# Patient Record
Sex: FEMALE | Race: WHITE | NOT HISPANIC OR LATINO | Employment: OTHER | URBAN - METROPOLITAN AREA
[De-identification: names, ages, dates, MRNs, and addresses within clinical notes are randomized per-mention and may not be internally consistent; named-entity substitution may affect disease eponyms.]

---

## 2017-05-25 ENCOUNTER — HOSPITAL ENCOUNTER (OUTPATIENT)
Dept: MRI IMAGING | Facility: HOSPITAL | Age: 67
Discharge: HOME | End: 2017-05-25
Attending: FAMILY MEDICINE | Admitting: FAMILY MEDICINE

## 2017-05-26 ENCOUNTER — HOSPITAL ENCOUNTER (OUTPATIENT)
Dept: MRI IMAGING | Facility: HOSPITAL | Age: 67
Discharge: HOME | End: 2017-05-26
Attending: FAMILY MEDICINE | Admitting: FAMILY MEDICINE

## 2019-04-12 ENCOUNTER — HOSPITAL ENCOUNTER (OUTPATIENT)
Dept: OTHER | Facility: HOSPITAL | Age: 69
Setting detail: SPECIMEN
Discharge: HOME | End: 2019-04-12
Attending: INTERNAL MEDICINE | Admitting: INTERNAL MEDICINE

## 2019-04-12 ENCOUNTER — ON CAMPUS - OUTPATIENT (AMBULATORY)
Dept: URBAN - METROPOLITAN AREA HOSPITAL 2 | Facility: HOSPITAL | Age: 69
End: 2019-04-12

## 2019-04-12 ENCOUNTER — OFFICE (AMBULATORY)
Dept: URBAN - METROPOLITAN AREA PATHOLOGY 4 | Facility: PATHOLOGY | Age: 69
End: 2019-04-12

## 2019-04-12 VITALS
OXYGEN SATURATION: 96 % | DIASTOLIC BLOOD PRESSURE: 82 MMHG | HEIGHT: 62 IN | HEART RATE: 68 BPM | HEART RATE: 66 BPM | RESPIRATION RATE: 18 BRPM | DIASTOLIC BLOOD PRESSURE: 67 MMHG | HEART RATE: 74 BPM | SYSTOLIC BLOOD PRESSURE: 108 MMHG | SYSTOLIC BLOOD PRESSURE: 109 MMHG | WEIGHT: 152 LBS | DIASTOLIC BLOOD PRESSURE: 71 MMHG | SYSTOLIC BLOOD PRESSURE: 112 MMHG | OXYGEN SATURATION: 94 % | SYSTOLIC BLOOD PRESSURE: 105 MMHG | HEART RATE: 70 BPM | DIASTOLIC BLOOD PRESSURE: 68 MMHG | DIASTOLIC BLOOD PRESSURE: 54 MMHG | DIASTOLIC BLOOD PRESSURE: 91 MMHG | TEMPERATURE: 97 F | HEART RATE: 62 BPM | SYSTOLIC BLOOD PRESSURE: 142 MMHG | HEART RATE: 72 BPM | DIASTOLIC BLOOD PRESSURE: 58 MMHG | SYSTOLIC BLOOD PRESSURE: 90 MMHG | RESPIRATION RATE: 16 BRPM | OXYGEN SATURATION: 97 % | OXYGEN SATURATION: 99 % | HEART RATE: 69 BPM | SYSTOLIC BLOOD PRESSURE: 84 MMHG | OXYGEN SATURATION: 100 % | DIASTOLIC BLOOD PRESSURE: 83 MMHG

## 2019-04-12 DIAGNOSIS — Z86.010 PERSONAL HISTORY OF COLONIC POLYPS: ICD-10-CM

## 2019-04-12 DIAGNOSIS — K64.1 SECOND DEGREE HEMORRHOIDS: ICD-10-CM

## 2019-04-12 DIAGNOSIS — D12.3 BENIGN NEOPLASM OF TRANSVERSE COLON: ICD-10-CM

## 2019-04-12 LAB
GI HISTOLOGY: A. UNSPECIFIED: (no result)
GI HISTOLOGY: PDF REPORT: (no result)

## 2019-04-12 PROCEDURE — 45385 COLONOSCOPY W/LESION REMOVAL: CPT | Mod: PT | Performed by: INTERNAL MEDICINE

## 2019-04-12 PROCEDURE — 88305 TISSUE EXAM BY PATHOLOGIST: CPT | Mod: 26 | Performed by: INTERNAL MEDICINE

## 2020-02-18 ENCOUNTER — HOSPITAL ENCOUNTER (INPATIENT)
Facility: HOSPITAL | Age: 70
LOS: 3 days | Discharge: HOME HEALTH | End: 2020-02-21
Attending: INTERNAL MEDICINE | Admitting: HOSPITALIST

## 2020-02-18 PROBLEM — I21.4 NON-STEMI (NON-ST ELEVATED MYOCARDIAL INFARCTION) (HCC): Status: ACTIVE | Noted: 2020-02-18

## 2020-02-18 PROCEDURE — 99222 1ST HOSP IP/OBS MODERATE 55: CPT | Performed by: NURSE PRACTITIONER

## 2020-02-18 RX ORDER — DULOXETIN HYDROCHLORIDE 30 MG/1
60 CAPSULE, DELAYED RELEASE ORAL 2 TIMES DAILY
Status: DISCONTINUED | OUTPATIENT
Start: 2020-02-19 | End: 2020-02-21 | Stop reason: HOSPADM

## 2020-02-18 RX ORDER — SODIUM CHLORIDE 0.9 % (FLUSH) 0.9 %
10 SYRINGE (ML) INJECTION EVERY 12 HOURS SCHEDULED
Status: DISCONTINUED | OUTPATIENT
Start: 2020-02-18 | End: 2020-02-21 | Stop reason: HOSPADM

## 2020-02-18 RX ORDER — ONDANSETRON 4 MG/1
4 TABLET, FILM COATED ORAL EVERY 6 HOURS PRN
Status: DISCONTINUED | OUTPATIENT
Start: 2020-02-18 | End: 2020-02-21 | Stop reason: HOSPADM

## 2020-02-18 RX ORDER — LISINOPRIL 20 MG/1
20 TABLET ORAL DAILY
Status: DISCONTINUED | OUTPATIENT
Start: 2020-02-19 | End: 2020-02-21 | Stop reason: HOSPADM

## 2020-02-18 RX ORDER — ONDANSETRON 2 MG/ML
4 INJECTION INTRAMUSCULAR; INTRAVENOUS EVERY 6 HOURS PRN
Status: DISCONTINUED | OUTPATIENT
Start: 2020-02-18 | End: 2020-02-21 | Stop reason: HOSPADM

## 2020-02-18 RX ORDER — CLONAZEPAM 1 MG/1
1 TABLET ORAL 2 TIMES DAILY PRN
COMMUNITY
End: 2020-09-04

## 2020-02-18 RX ORDER — OXYCODONE HYDROCHLORIDE 5 MG/1
10 TABLET ORAL EVERY 6 HOURS PRN
Status: DISCONTINUED | OUTPATIENT
Start: 2020-02-18 | End: 2020-02-21 | Stop reason: HOSPADM

## 2020-02-18 RX ORDER — DIPHENOXYLATE HYDROCHLORIDE AND ATROPINE SULFATE 2.5; .025 MG/1; MG/1
1 TABLET ORAL 2 TIMES DAILY
Status: DISCONTINUED | OUTPATIENT
Start: 2020-02-19 | End: 2020-02-21 | Stop reason: HOSPADM

## 2020-02-18 RX ORDER — DIPHENOXYLATE HYDROCHLORIDE AND ATROPINE SULFATE 2.5; .025 MG/1; MG/1
1 TABLET ORAL 2 TIMES DAILY
COMMUNITY

## 2020-02-18 RX ORDER — SODIUM CHLORIDE 0.9 % (FLUSH) 0.9 %
10 SYRINGE (ML) INJECTION AS NEEDED
Status: DISCONTINUED | OUTPATIENT
Start: 2020-02-18 | End: 2020-02-21 | Stop reason: HOSPADM

## 2020-02-18 RX ORDER — LISINOPRIL 20 MG/1
20 TABLET ORAL AS NEEDED
COMMUNITY

## 2020-02-18 RX ORDER — OXYCODONE HYDROCHLORIDE 10 MG/1
10 TABLET ORAL EVERY 6 HOURS PRN
COMMUNITY

## 2020-02-18 RX ORDER — SODIUM CHLORIDE 9 MG/ML
100 INJECTION, SOLUTION INTRAVENOUS CONTINUOUS
Status: DISCONTINUED | OUTPATIENT
Start: 2020-02-18 | End: 2020-02-21 | Stop reason: HOSPADM

## 2020-02-18 RX ORDER — GABAPENTIN 300 MG/1
300 CAPSULE ORAL 3 TIMES DAILY
Status: DISCONTINUED | OUTPATIENT
Start: 2020-02-19 | End: 2020-02-21 | Stop reason: HOSPADM

## 2020-02-18 RX ORDER — DULOXETIN HYDROCHLORIDE 60 MG/1
60 CAPSULE, DELAYED RELEASE ORAL 2 TIMES DAILY
COMMUNITY

## 2020-02-18 RX ORDER — CLONAZEPAM 1 MG/1
1 TABLET ORAL 2 TIMES DAILY PRN
Status: DISCONTINUED | OUTPATIENT
Start: 2020-02-18 | End: 2020-02-21 | Stop reason: HOSPADM

## 2020-02-18 RX ORDER — GABAPENTIN 300 MG/1
300 CAPSULE ORAL 3 TIMES DAILY
COMMUNITY
End: 2020-09-04

## 2020-02-18 RX ADMIN — Medication 10 ML: at 21:48

## 2020-02-18 RX ADMIN — Medication 10 ML: at 21:47

## 2020-02-18 RX ADMIN — SODIUM CHLORIDE 100 ML/HR: 900 INJECTION, SOLUTION INTRAVENOUS at 22:07

## 2020-02-18 NOTE — NURSING NOTE
Pt arrived via ambulance accompanied by from [facility].  Pt placed in bed and attached to monitors. Denies pain at present and VSS  on monitor. Reported pt had some c/p on ride over but informed EMT was getting better and no tx given.No changes on monitor noted. Report received from Padmini at [facility] in [place]

## 2020-02-19 ENCOUNTER — APPOINTMENT (OUTPATIENT)
Dept: NUCLEAR MEDICINE | Facility: HOSPITAL | Age: 70
End: 2020-02-19

## 2020-02-19 PROBLEM — K21.9 GASTROESOPHAGEAL REFLUX DISEASE: Status: ACTIVE | Noted: 2020-02-19

## 2020-02-19 PROBLEM — I25.10 CORONARY ARTERY DISEASE: Status: ACTIVE | Noted: 2020-02-19

## 2020-02-19 PROBLEM — E78.5 HYPERLIPIDEMIA: Status: ACTIVE | Noted: 2020-02-19

## 2020-02-19 PROBLEM — F32.A DEPRESSION: Status: ACTIVE | Noted: 2020-02-19

## 2020-02-19 LAB
ANION GAP SERPL CALCULATED.3IONS-SCNC: 9 MMOL/L (ref 5–15)
APTT PPP: 25.6 SECONDS (ref 24–31)
BASOPHILS # BLD AUTO: 0.1 10*3/MM3 (ref 0–0.2)
BASOPHILS NFR BLD AUTO: 1.8 % (ref 0–1.5)
BUN BLD-MCNC: 13 MG/DL (ref 8–23)
BUN/CREAT SERPL: 13 (ref 7–25)
CALCIUM SPEC-SCNC: 8.1 MG/DL (ref 8.6–10.5)
CHLORIDE SERPL-SCNC: 106 MMOL/L (ref 98–107)
CHOLEST SERPL-MCNC: 146 MG/DL (ref 0–200)
CK SERPL-CCNC: 40 U/L (ref 20–180)
CO2 SERPL-SCNC: 25 MMOL/L (ref 22–29)
CREAT BLD-MCNC: 1 MG/DL (ref 0.57–1)
DEPRECATED RDW RBC AUTO: 40.3 FL (ref 37–54)
EOSINOPHIL # BLD AUTO: 0.3 10*3/MM3 (ref 0–0.4)
EOSINOPHIL NFR BLD AUTO: 9.6 % (ref 0.3–6.2)
ERYTHROCYTE [DISTWIDTH] IN BLOOD BY AUTOMATED COUNT: 12.4 % (ref 12.3–15.4)
GFR SERPL CREATININE-BSD FRML MDRD: 55 ML/MIN/1.73
GLUCOSE BLD-MCNC: 124 MG/DL (ref 65–99)
HCT VFR BLD AUTO: 33.3 % (ref 34–46.6)
HDLC SERPL-MCNC: 38 MG/DL (ref 40–60)
HGB BLD-MCNC: 11.8 G/DL (ref 12–15.9)
INR PPP: 0.99 (ref 0.9–1.1)
LDLC SERPL CALC-MCNC: 79 MG/DL (ref 0–100)
LDLC/HDLC SERPL: 2.07 {RATIO}
LYMPHOCYTES # BLD AUTO: 1.2 10*3/MM3 (ref 0.7–3.1)
LYMPHOCYTES NFR BLD AUTO: 37.3 % (ref 19.6–45.3)
MCH RBC QN AUTO: 32.4 PG (ref 26.6–33)
MCHC RBC AUTO-ENTMCNC: 35.4 G/DL (ref 31.5–35.7)
MCV RBC AUTO: 91.7 FL (ref 79–97)
MONOCYTES # BLD AUTO: 0.4 10*3/MM3 (ref 0.1–0.9)
MONOCYTES NFR BLD AUTO: 13.3 % (ref 5–12)
NEUTROPHILS # BLD AUTO: 1.2 10*3/MM3 (ref 1.7–7)
NEUTROPHILS NFR BLD AUTO: 38 % (ref 42.7–76)
NRBC BLD AUTO-RTO: 0.1 /100 WBC (ref 0–0.2)
PLATELET # BLD AUTO: 214 10*3/MM3 (ref 140–450)
PMV BLD AUTO: 8.2 FL (ref 6–12)
POTASSIUM BLD-SCNC: 3.9 MMOL/L (ref 3.5–5.2)
PROTHROMBIN TIME: 10.4 SECONDS (ref 9.6–11.7)
RBC # BLD AUTO: 3.63 10*6/MM3 (ref 3.77–5.28)
SODIUM BLD-SCNC: 140 MMOL/L (ref 136–145)
TRIGL SERPL-MCNC: 146 MG/DL (ref 0–150)
TROPONIN T SERPL-MCNC: <0.01 NG/ML (ref 0–0.03)
TROPONIN T SERPL-MCNC: <0.01 NG/ML (ref 0–0.03)
TSH SERPL DL<=0.05 MIU/L-ACNC: 0.66 UIU/ML (ref 0.27–4.2)
VLDLC SERPL-MCNC: 29.2 MG/DL
WBC NRBC COR # BLD: 3.1 10*3/MM3 (ref 3.4–10.8)

## 2020-02-19 PROCEDURE — 97116 GAIT TRAINING THERAPY: CPT

## 2020-02-19 PROCEDURE — 84484 ASSAY OF TROPONIN QUANT: CPT | Performed by: NURSE PRACTITIONER

## 2020-02-19 PROCEDURE — 25010000002 PROMETHAZINE PER 50 MG: Performed by: HOSPITALIST

## 2020-02-19 PROCEDURE — 84443 ASSAY THYROID STIM HORMONE: CPT | Performed by: NURSE PRACTITIONER

## 2020-02-19 PROCEDURE — 93018 CV STRESS TEST I&R ONLY: CPT | Performed by: INTERNAL MEDICINE

## 2020-02-19 PROCEDURE — 82550 ASSAY OF CK (CPK): CPT | Performed by: NURSE PRACTITIONER

## 2020-02-19 PROCEDURE — 99232 SBSQ HOSP IP/OBS MODERATE 35: CPT | Performed by: HOSPITALIST

## 2020-02-19 PROCEDURE — 85025 COMPLETE CBC W/AUTO DIFF WBC: CPT | Performed by: NURSE PRACTITIONER

## 2020-02-19 PROCEDURE — 85730 THROMBOPLASTIN TIME PARTIAL: CPT | Performed by: NURSE PRACTITIONER

## 2020-02-19 PROCEDURE — 25010000002 REGADENOSON 0.4 MG/5ML SOLUTION: Performed by: HOSPITALIST

## 2020-02-19 PROCEDURE — 99222 1ST HOSP IP/OBS MODERATE 55: CPT | Performed by: INTERNAL MEDICINE

## 2020-02-19 PROCEDURE — 0 TECHNETIUM SESTAMIBI: Performed by: HOSPITALIST

## 2020-02-19 PROCEDURE — 85610 PROTHROMBIN TIME: CPT | Performed by: NURSE PRACTITIONER

## 2020-02-19 PROCEDURE — 78452 HT MUSCLE IMAGE SPECT MULT: CPT

## 2020-02-19 PROCEDURE — A9500 TC99M SESTAMIBI: HCPCS | Performed by: HOSPITALIST

## 2020-02-19 PROCEDURE — 97162 PT EVAL MOD COMPLEX 30 MIN: CPT

## 2020-02-19 PROCEDURE — 97112 NEUROMUSCULAR REEDUCATION: CPT

## 2020-02-19 PROCEDURE — 97110 THERAPEUTIC EXERCISES: CPT

## 2020-02-19 PROCEDURE — 80061 LIPID PANEL: CPT | Performed by: NURSE PRACTITIONER

## 2020-02-19 PROCEDURE — 78452 HT MUSCLE IMAGE SPECT MULT: CPT | Performed by: INTERNAL MEDICINE

## 2020-02-19 PROCEDURE — 80048 BASIC METABOLIC PNL TOTAL CA: CPT | Performed by: NURSE PRACTITIONER

## 2020-02-19 PROCEDURE — 93016 CV STRESS TEST SUPVJ ONLY: CPT | Performed by: INTERNAL MEDICINE

## 2020-02-19 PROCEDURE — 93017 CV STRESS TEST TRACING ONLY: CPT

## 2020-02-19 PROCEDURE — 25010000002 ENOXAPARIN PER 10 MG: Performed by: HOSPITALIST

## 2020-02-19 RX ORDER — PANTOPRAZOLE SODIUM 40 MG/10ML
40 INJECTION, POWDER, LYOPHILIZED, FOR SOLUTION INTRAVENOUS
Status: DISCONTINUED | OUTPATIENT
Start: 2020-02-19 | End: 2020-02-19

## 2020-02-19 RX ORDER — PANTOPRAZOLE SODIUM 40 MG/1
40 TABLET, DELAYED RELEASE ORAL 2 TIMES DAILY
Status: DISCONTINUED | OUTPATIENT
Start: 2020-02-19 | End: 2020-02-21 | Stop reason: HOSPADM

## 2020-02-19 RX ADMIN — Medication 10 ML: at 21:29

## 2020-02-19 RX ADMIN — GABAPENTIN 300 MG: 300 CAPSULE ORAL at 22:01

## 2020-02-19 RX ADMIN — DULOXETINE 60 MG: 30 CAPSULE, DELAYED RELEASE ORAL at 00:12

## 2020-02-19 RX ADMIN — REGADENOSON 0.4 MG: 0.08 INJECTION, SOLUTION INTRAVENOUS at 13:40

## 2020-02-19 RX ADMIN — DIPHENOXYLATE HYDROCHLORIDE AND ATROPINE SULFATE 1 TABLET: 2.5; .025 TABLET ORAL at 00:12

## 2020-02-19 RX ADMIN — CLONAZEPAM 1 MG: 1 TABLET ORAL at 02:49

## 2020-02-19 RX ADMIN — SODIUM CHLORIDE 12.5 MG: 900 INJECTION, SOLUTION INTRAVENOUS at 23:39

## 2020-02-19 RX ADMIN — GABAPENTIN 300 MG: 300 CAPSULE ORAL at 16:39

## 2020-02-19 RX ADMIN — OXYCODONE HYDROCHLORIDE 10 MG: 5 TABLET ORAL at 21:36

## 2020-02-19 RX ADMIN — OXYCODONE HYDROCHLORIDE 10 MG: 5 TABLET ORAL at 11:10

## 2020-02-19 RX ADMIN — GABAPENTIN 300 MG: 300 CAPSULE ORAL at 00:12

## 2020-02-19 RX ADMIN — OXYCODONE HYDROCHLORIDE 10 MG: 5 TABLET ORAL at 00:12

## 2020-02-19 RX ADMIN — DULOXETINE 60 MG: 30 CAPSULE, DELAYED RELEASE ORAL at 21:29

## 2020-02-19 RX ADMIN — PANTOPRAZOLE SODIUM 40 MG: 40 TABLET, DELAYED RELEASE ORAL at 18:01

## 2020-02-19 RX ADMIN — ENOXAPARIN SODIUM 40 MG: 40 INJECTION SUBCUTANEOUS at 16:39

## 2020-02-19 RX ADMIN — LIDOCAINE HYDROCHLORIDE: 20 SOLUTION ORAL; TOPICAL at 18:01

## 2020-02-19 RX ADMIN — SODIUM CHLORIDE 100 ML/HR: 900 INJECTION, SOLUTION INTRAVENOUS at 10:33

## 2020-02-19 RX ADMIN — DIPHENOXYLATE HYDROCHLORIDE AND ATROPINE SULFATE 1 TABLET: 2.5; .025 TABLET ORAL at 21:29

## 2020-02-19 RX ADMIN — TECHNETIUM TC 99M SESTAMIBI 1 DOSE: 1 INJECTION INTRAVENOUS at 11:00

## 2020-02-19 RX ADMIN — TECHNETIUM TC 99M SESTAMIBI 1 DOSE: 1 INJECTION INTRAVENOUS at 13:40

## 2020-02-19 RX ADMIN — CLONAZEPAM 1 MG: 1 TABLET ORAL at 21:36

## 2020-02-19 RX ADMIN — Medication 10 ML: at 10:34

## 2020-02-19 NOTE — THERAPY EVALUATION
Patient Name: Katharina Brennan  : 1950    MRN: [id number]                              Today's Date: 2020       Admit Date: 2020    Visit Dx: No diagnosis found.  Patient Active Problem List   Diagnosis   • Non-STEMI (non-ST elevated myocardial infarction) (CMS/HCC)   • Coronary artery disease   • Depression   • Essential hypertension   • Gastroesophageal reflux disease   • Hyperlipidemia   • Irritable bowel syndrome   • Other dorsalgia   • Psoriasis     Past Medical History:   Diagnosis Date   • CAD (coronary artery disease)    • GERD (gastroesophageal reflux disease)    • Hypertension    • Mood disorder (CMS/HCC)      History reviewed. No pertinent surgical history.  General Information     Row Name 20 1143          PT Evaluation Time/Intention    Document Type  evaluation  -     Mode of Treatment  individual therapy  -     Row Name 20 1143          General Information    Patient Profile Reviewed?  yes  -     Prior Level of Function  independent:;ADL's  -     Row Name 20 1143          Relationship/Environment    Lives With  alone  -     Row Name 20 1143          Resource/Environmental Concerns    Current Living Arrangements  home/apartment/condo  -     Row Name 20 1143          Cognitive Assessment/Intervention- PT/OT    Orientation Status (Cognition)  oriented x 4  -     Row Name 20 1143          Safety Issues, Functional Mobility    Impairments Affecting Function (Mobility)  balance;strength;endurance/activity tolerance;coordination  -       User Key  (r) = Recorded By, (t) = Taken By, (c) = Cosigned By    Initials Name Provider Type    Radha Corcoran, PT Physical Therapist        Mobility     Row Name 20 1155 20 1145       Bed Mobility Assessment/Treatment    Bed Mobility Assessment/Treatment  --  bed mobility (all) activities  -    [place] Level (Bed Mobility)  -- vertigo supervision  -  supervision  -    Row Name  02/19/20 1145          Bed-Chair Transfer    Bed-Chair [place] (Transfers)  contact guard;1 person assist  -     Row Name 02/19/20 1145          Sit-Stand Transfer    Sit-Stand [place] (Transfers)  contact guard  -     Assistive Device (Sit-Stand Transfers)  -- UE support on  pole or FWW  -     Row Name 02/19/20 1145          Gait/Stairs Assessment/Training    Gait/Stairs Assessment/Training  gait/ambulation independence  -     [place] Level (Gait)  contact guard  -     Assistive Device (Gait)  -- UE support on pole or FWW due to deminished ankle balance reactions  -     Distance in Feet (Gait)  25 feet  -     Pattern (Gait)  step-to  -       User Key  (r) = Recorded By, (t) = Taken By, (c) = Cosigned By    Initials Name Provider Type     Radha Paniagua PT Physical Therapist        Obj/Interventions     Row Name 02/19/20 1148          General ROM    GENERAL ROM COMMENTS  WFL's JULIANO hips and knees  -     Row Name 02/19/20 1148          MMT (Manual Muscle Testing)    General MMT Comments  JULIANO LE strength 4-/5 grossly for hips and knees and JULIANO ankles 3-/5  -     Row Name 02/19/20 1148          Therapeutic Exercise    Lower Extremity Range of Motion (Therapeutic Exercise)  hip flexion/extension, bilateral;knee flexion/extension, left;ankle dorsiflexion/plantar flexion, bilateral  -     Exercise Type (Therapeutic Exercise)  AROM (active range of motion)  -     Position (Therapeutic Exercise)  seated  -     Row Name 02/19/20 1148          Static Sitting Balance    Level of [place] (Unsupported Sitting, Static Balance)  supervision  -     Sitting Position (Unsupported Sitting, Static Balance)  sitting on edge of bed  -     Time Able to Maintain Position (Unsupported Sitting, Static Balance)  more than 5 minutes  -     Row Name 02/19/20 1148          Dynamic Sitting Balance    Level of [place], Reaches Outside Midline (Sitting, Dynamic Balance)  supervision  -      Sitting Position, Reaches Outside Midline (Sitting, Dynamic Balance)  sitting on edge of bed  -     Row Name 02/19/20 1148          Static Standing Balance    Level of [place] (Supported Standing, Static Balance)  contact guard assist  -     Time Able to Maintain Position (Supported Standing, Static Balance)  3 to 4 minutes  -     Assistive Device Utilized (Supported Standing, Static Balance)  walker, rolling  -       User Key  (r) = Recorded By, (t) = Taken By, (c) = Cosigned By    Initials Name Provider Type    Radha Corcoran PT Physical Therapist        Goals/Plan     Row Name 02/19/20 1151          Transfer Goal 1 (PT)    Activity/Assistive Device (Transfer Goal 1, PT)  sit-to-stand/stand-to-sit;bed-to-chair/chair-to-bed  -     [place] Level/Cues Needed (Transfer Goal 1, PT)  conditional independence  -WC     Time Frame (Transfer Goal 1, PT)  long term goal (LTG);2 weeks  -[facility] Name 02/19/20 1151          Gait Training Goal 1 (PT)    Activity/Assistive Device (Gait Training Goal 1, PT)  gait (walking locomotion);walker, rolling  -WC     [place] Level (Gait Training Goal 1, PT)  conditional independence  -WC     Distance (Gait Goal 1, PT)  100 feet  -WC     Time Frame (Gait Training Goal 1, PT)  long term goal (LTG);2 weeks  -[facility] Name 02/19/20 1151          Patient Education Goal (PT)    Activity (Patient Education Goal, PT)  HEP ankle ROM exercises and LE  strengthening  -     [place]/Cues/Accuracy (Memory Goal 2, PT)  demonstrates adequately;verbalizes understanding  -       User Key  (r) = Recorded By, (t) = Taken By, (c) = Cosigned By    Initials Name Provider Type    Radha Corcoran PT Physical Therapist        Clinical Impression     Row Name 02/19/20 1150          Pain Assessment    Additional Documentation  Pain Scale: Numbers Pre/Post-Treatment (Group)  -[facility] Name 02/19/20 1150          Pain Scale: Numbers Pre/Post-Treatment    Pain Scale:  Numbers, Pretreatment  0/10 - no pain  -     Pain Scale: Numbers, Post-Treatment  0/10 - no pain  -     Row Name 02/19/20 1150          Plan of Care Review    Plan of Care Reviewed With  patient  -     Row Name 02/19/20 1150          Physical Therapy Clinical Impression    Criteria for Skilled Interventions Met (PT Clinical Impression)  yes  -WC     Rehab Potential (PT Clinical Summary)  fair, will monitor progress closely  -     Predicted Duration of Therapy (PT)  2 weeks  -     Row Name 02/19/20 1150          Vital Signs    Pre Patient Position  Supine  -WC     Intra Patient Position  Standing  -WC     Post Patient Position  Sitting  -     Row Name 02/19/20 1150          Positioning and Restraints    Pre-Treatment Position  in bed  -WC     Post Treatment Position  chair  -     Restraints  notified nsg:  -       User Key  (r) = Recorded By, (t) = Taken By, (c) = Cosigned By    Initials Name Provider Type    Radha Corcoran PT Physical Therapist        Outcome Measures     Row Name 02/19/20 1153          How much help from another person do you currently need...    Turning from your back to your side while in flat bed without using bedrails?  4  -WC     Moving from lying on back to sitting on the side of a flat bed without bedrails?  4  -WC     Moving to and from a bed to a chair (including a wheelchair)?  3  -WC     Standing up from a chair using your arms (e.g., wheelchair, bedside chair)?  3  -WC     Climbing 3-5 steps with a railing?  2  -WC     To walk in hospital room?  2  -WC     AM-PAC 6 Clicks Score (PT)  18  -     Row Name 02/19/20 1153          Functional Assessment    Outcome Measure Options  AM-PAC 6 Clicks Basic Mobility (PT)  -       User Key  (r) = Recorded By, (t) = Taken By, (c) = Cosigned By    Initials Name Provider Type    Radha Corcoran PT Physical Therapist        Physical Therapy Education                 Title: PT OT SLP Therapies (Done)     Topic: Physical Therapy  (Done)     Point: Mobility training (Done)     Description:   Instruct learner(s) on safety and technique for assisting patient out of bed, chair or wheelchair.  Instruct in the proper use of assistive devices, such as walker, crutches, cane or brace.              Patient Friendly Description:   It's important to get you on your feet again, but we need to do so in a way that is safe for you. Falling has serious consequences, and your personal safety is the most important thing of all.        When it's time to get out of bed, one of us or a family member will sit next to you on the bed to give you support.     If your doctor or nurse tells you to use a walker, crutches, a cane, or a brace, be sure you use it every time you get out of bed, even if you think you don't need it.    Learning Progress Summary           Patient Acceptance, E,TB, VU by  at 2/19/2020 1154                   Point: Home exercise program (Done)     Description:   Instruct learner(s) on appropriate technique for monitoring, assisting and/or progressing patient with therapeutic exercises and activities.              Learning Progress Summary           Patient Acceptance, E,TB, VU by  at 2/19/2020 1154                   Point: Body mechanics (Done)     Description:   Instruct learner(s) on proper positioning and spine alignment for patient and/or caregiver during mobility tasks and/or exercises.              Learning Progress Summary           Patient Acceptance, E,TB, VU by  at 2/19/2020 1154                   Point: Precautions (Done)     Description:   Instruct learner(s) on prescribed precautions during mobility and gait tasks              Learning Progress Summary           Patient Acceptance, E,TB, VU by  at 2/19/2020 1154                               User Key     Initials Effective Dates Name Provider Type Discipline     01/07/20 -  Radha Paniagua PT Physical Therapist PT              PT Recommendation and Plan  Planned Therapy  Interventions (PT Eval): balance training, gait training, home exercise program, patient/family education, strengthening  Outcome Summary/Treatment Plan (PT)  Anticipated Discharge Disposition (PT): home with home health  Plan of Care Reviewed With: patient  Outcome Summary: Pt is a 70 yo female Non-STEMI (non-ST elevated MI). Prior: Pt lived alone. Pt was independent with bed mobility dressing, bathing, and walking with walker short distances. This evaluation pt needed supervision bed mobility however with vertigo requiring extra recovery time sitting. CGA sit to stand and AMB 25 feet CGA with UE support due to hx unstable ankles FX and surgical repairs. Recommendation in D/C home with HH.     Time Calculation:   PT Charges     Row Name 02/19/20 1201             Time Calculation    Start Time  0845  -WC      Stop Time  0920  -WC      Time Calculation (min)  35 min  -WC      PT Received On  02/19/20  -WC      PT - Next Appointment  02/20/20  -WC      PT Goal Re-Cert Due Date  03/04/20  -WC         Time Calculation- PT    TCU Minutes- PT  30 min  -WC        User Key  (r) = Recorded By, (t) = Taken By, (c) = Cosigned By    Initials Name Provider Type    WC Radha Paniagua, PT Physical Therapist        Therapy Charges for Today     Code Description Service Date Service Provider Modifiers Qty    01799390650 HC PT EVAL MOD COMPLEXITY 3 2/19/2020 Radha Paniagua, PT GP 1    83002406929 HC PT THER PROC EA 15 MIN 2/19/2020 Radha Paniagua, PT GP 1    07268689366 HC PT NEUROMUSC RE EDUCATION EA 15 MIN 2/19/2020 Radha Paniagua, PT GP 1    42415890362 HC GAIT TRAINING EA 15 MIN 2/19/2020 Radha Paniagua, PT GP 1          PT G-Codes  Outcome Measure Options: AM-PAC 6 Clicks Basic Mobility (PT)  AM-PAC 6 Clicks Score (PT): 18    Radha Paniagua PT  2/19/2020

## 2020-02-19 NOTE — PLAN OF CARE
Problem: Patient Care Overview  Goal: Plan of Care Review  Outcome: Ongoing (interventions implemented as appropriate)  Flowsheets (Taken 2/19/2020 1156)  Outcome Summary: Pt is a 68 yo female Non-STEMI (non-ST elevated MI). Prior: Pt lived alone. Pt was independent with bed mobility dressing, bathing, and walking with walker short distances. This evaluation pt needed supervision bed mobility however with vertigo requiring extra recovery time sitting. CGA sit to stand and AMB 25 feet CGA with UE support due to hx unstable ankles FX and surgical repairs. Recommendation in D/C home with HH.

## 2020-02-19 NOTE — DISCHARGE PLACEMENT REQUEST
"Katharina Brennan (69 y.o. Female)     Date of Birth Social Security Number Address Home Phone MRN    1950  [address] [address] [phone] [id number]    Religious Marital Status          None Single       Admission Date Admission Type Admitting Provider Attending Provider Department, Room/Bed    2/18/20 Urgent Inge Kline MD Nallapuram, Divya, MD [facility], 2106/1    Discharge Date Discharge Disposition Discharge Destination                       Attending Provider:  Inge Kline MD    Allergies:  Iodine, Naproxen, Methotrexate Derivatives, Morphine, Sulfa Antibiotics, Sulfamethoxazole    Isolation:  None   Infection:  None   Code Status:  CPR    Ht:  157.5 cm (62\")   Wt:  73.6 kg (162 lb 4.1 oz)    Admission Cmt:  None   Principal Problem:  Non-STEMI (non-ST elevated myocardial infarction) (CMS/[facility]) [I21.4]                 Active Insurance as of 2/18/2020     Primary Coverage     Payor Plan Insurance Group Employer/Plan Group    [facility] MEDICARE REPLACEMENT [facility] MEDICARE REPLACEMENT [id number]     Payor Plan Address Payor Plan Phone Number Payor Plan Fax Number Effective Dates    [address] [address]   1/1/2020 - None Entered    [facility] [id number]       Subscriber Name Subscriber Birth Date Member ID       KATHARINA BRENNAN 1950 [id number]                 Emergency Contacts      (Rel.) Home Phone Work Phone Mobile Phone    EMJACQUES (Relative) [phone] -- [phone]              "

## 2020-02-19 NOTE — H&P
[facility] Medicine Services      Patient Name: Katharina Brennan  : 1950  MRN: [id number]  Primary Care Physician: Gregory Gallo MD  Date of admission: 2020    Patient Care Team:  Gregory Gallo MD as PCP - General  Gregoyr Gallo MD as PCP - Family Medicine          Subjective   History Present Illness     Chief Complaint: Non-STEMI    Ms. Brennan is a 69 y.o.  presents to [facility] complaining of generalized weakness with evidence of non-STEMI prior facility         69-year-old female presents as a transfer from [facility] in [place] where she was admitted on 2020 for generalized weakness weakness and indeterminately elevated troponin.  In the hospital stay the patient's troponin increased from initial 0.06-0.11 over the course of serial labs.  At that point the patient's cardiologist, Dr. Estrella, was contacted and the decision was made to transfer the patient to this facility for further valuation.  The patient reports that her main complaint was sudden onset generalized weakness and body aching which made it difficult for her to even get out of bed.  She does report chest pain which is worse with movement.  She had nausea and vomiting x2 days and reports some confusion.  She reports soaking night sweats several nights over the last week.  She has a history of arthritis with arthralgia low back pain, but does not normally have myalgia.  She has had no recent change to her medication.    Review of records from prior facility show that the patient had hemoglobin 10.7 on 2020 with comparison 11.5 on 2020, magnesium level 1.8; sodium 139, potassium 4.3, creatinine 1.07 on 2020 which was improved from 1.11 on admission after hydration with IV fluids 1 L bolus followed by D5 and half with potassium..  The patient was given aspirin 324 mg; Plavix 300 mg prior to transfer; and home medications of duloxetine,  clonazepam, gabapentin, and lisinopril were continued.  She was given Lovenox for VTE prophylaxis.  X-ray report written report and EKG reviewed from prior facility without acute findings.    Patient reported history: He has been on Humira in the past for psoriasis but had to come off of the medication because of side effects.  She was then tried on methotrexate and had acute hypotension with elevated liver function with improvement status post medication discontinuance.      Review of Systems   Constitution: Positive for malaise/fatigue and night sweats. Negative for chills and fever.   HENT: Negative for congestion, hoarse voice and sore throat.    Cardiovascular: Negative for chest pain.   Respiratory: Negative for cough and shortness of breath.    Musculoskeletal: Positive for arthritis, back pain, joint pain, muscle weakness and myalgias. Negative for falls, joint swelling, muscle cramps, neck pain and stiffness.   Gastrointestinal: Positive for nausea and vomiting. Negative for abdominal pain.   Neurological: Positive for difficulty with concentration, numbness and weakness. Negative for dizziness and focal weakness.        Numb feeling to the right lower extremity without motor deficit or sensory deficit noted   Psychiatric/Behavioral: Negative for altered mental status.   All other systems reviewed and are negative.          Personal History     Past Medical History:   Past Medical History:   Diagnosis Date   • CAD (coronary artery disease)    • GERD (gastroesophageal reflux disease)    • Hypertension    • Mood disorder (CMS/HCC)        Surgical History:    History reviewed. No pertinent surgical history.        Family History: family history is not on file. Otherwise pertinent FHx was reviewed and unremarkable.     Social History:  reports that she has never smoked. She has never used smokeless tobacco. She reports that she drank alcohol. She reports that she does not use drugs.      Medications:  Prior to  Admission medications    Not on File       Allergies:    Allergies   Allergen Reactions   • Iodine Anaphylaxis and Swelling   • Naproxen Swelling   • Methotrexate Derivatives Other (See Comments)     other   • Morphine Other (See Comments)     Neurologic   • Sulfa Antibiotics Unknown - High Severity   • Sulfamethoxazole Hives and Swelling       Objective   Objective     Vital Signs  Temp:  [98.3 °F (36.8 °C)-98.4 °F (36.9 °C)] 98.4 °F (36.9 °C)  Heart Rate:  [75-88] 88  Resp:  [20] 20  BP: (111-115)/(51-65) 111/51  SpO2:  [99 %] 99 %  on   ;   Device (Oxygen Therapy): room air  Body mass index is 29.64 kg/m².    Physical Exam   Constitutional: She is oriented to person, place, and time. She appears well-developed and well-nourished. No distress.   HENT:   Head: Normocephalic and atraumatic.   Eyes: Pupils are equal, round, and reactive to light. EOM are normal. No scleral icterus.   Neck: Normal range of motion. Neck supple. No JVD present. No tracheal deviation present. No thyromegaly present.   Cardiovascular: Normal rate, regular rhythm, normal heart sounds and intact distal pulses.   No murmur heard.  Pulmonary/Chest: Effort normal and breath sounds normal. No respiratory distress.   Abdominal: Soft. Bowel sounds are normal. She exhibits no distension. There is no tenderness.   Musculoskeletal: Normal range of motion. She exhibits no edema, tenderness or deformity.   History of bilateral ankle fracture without obvious deformity   Lymphadenopathy:     She has no cervical adenopathy.   Neurological: She is alert and oriented to person, place, and time. She displays normal reflexes. No cranial nerve deficit or sensory deficit. She exhibits normal muscle tone. Coordination normal.   Skin: Skin is warm and dry. Capillary refill takes less than 2 seconds. Rash noted. She is not diaphoretic.   Patient has significant diffuse psoriasis   Psychiatric: She has a normal mood and affect. Her behavior is normal. Judgment and  thought content normal.   Nursing note and vitals reviewed.      Results Review:  I have personally reviewed most recent cardiac tracings, lab results and radiology images and interpretations and agree with findings, most notably: Non-STEMI.              Invalid input(s):  ALKPHOS  CrCl cannot be calculated (Patient's most recent lab result is older than the maximum 30 days allowed.).  Brief Urine Lab Results     None          Microbiology Results (last 10 days)     ** No results found for the last 240 hours. **          ECG/EMG Results (most recent)     None            No radiology results for the last 7 days      CrCl cannot be calculated (Patient's most recent lab result is older than the maximum 30 days allowed.).    Assessment/Plan   Assessment/Plan       Active Hospital Problems    Diagnosis  POA   • **Non-STEMI (non-ST elevated myocardial infarction) (CMS/[facility]) [I21.4]  Yes     Priority: High   • Coronary artery disease [I25.10]  Yes     Priority: High   • Depression [F32.9]  Yes     Priority: Medium   • Gastroesophageal reflux disease [K21.9]  Yes     Priority: Medium   • Hyperlipidemia [E78.5]  Yes     Priority: Medium   • Essential hypertension [I10]  Yes     Priority: Medium   • Irritable bowel syndrome [K58.9]  Yes     Priority: Low   • Psoriasis [L40.9]  Yes     Priority: Low      Resolved Hospital Problems   No resolved problems to display.     Non-STEMI, troponin I increased from 0.06-0.11: Troponin now and in a.m.; cardiology consult    --Patient given aspirin 325 mg and Plavix 300 mg prior to transfer    Generalized weakness with nausea vomiting and night sweats: Check CK total    Renal injury, improving, creatinine 1.07 which was improved from 1.11--likely secondary to the azotemia from volume loss due to vomiting and night: IV fluids; BMP in a.m.    --Potassium 4.3; sodium 139, magnesium 1.8 at prior facility    Hypertension, chronic, controlled: Continue lisinopril    Anxiety, chronic: Continue  clonazepam, Cymbalta    IBS with primary diarrhea: Continue Lomotil     Arthritis with chronic pain: Continue oxycodone; continue gabapentin Psoriasis--with prior treatment failure Humira and allergy to methotrexate: Add Aquaphor    VTE Prophylaxis -   Mechanical Order History:     None      Pharmalogical Order History:     None          CODE STATUS:    Code Status and Medical Interventions:   Ordered at: 02/18/20 2309     Code Status:    CPR     Medical Interventions (Level of Support Prior to Arrest):    Full       Admission Status:  I believe this patient meets inpatient criteria.      I discussed the patient's findings and my recommendations with patient and nursing staff.        Electronically signed by ROSIE Cleveland, 02/18/20, 9:10 PM.  [facility] Hospitalist Team

## 2020-02-19 NOTE — CONSULTS
Referring Provider: Hospitalist  Reason for Consultation: Shortness of breath    Patient Care Team:  Gregory Gallo MD as PCP - General  Gregory Gallo MD as PCP - Family Medicine    Chief complaint shortness of breath and weakness    Subjective .     History of present illness:  Katharina Brennan is a 69 y.o. female with history of coronary disease hypertension presented to the hospital with complaints of weakness and shortness of breath and was admitted to the hospital because of her borderline elevated troponin.  Patient was admitted to the outlying hospital and they consulted me.  Patient's troponin was borderline elevated and she wanted to be transferred to the hospital here.  She did not have any chest pain.  No complains of any PND orthopnea.  No palpitations dizziness syncope or swelling of the feet.  She is been taking her medicines regularly.  Patient was then transferred and here in our hospital the troponin has been within normal limits.    Review of Systems   Constitution: Negative for fever and malaise/fatigue.   HENT: Negative for ear pain and nosebleeds.    Eyes: Negative for blurred vision and double vision.   Cardiovascular: Negative for chest pain, dyspnea on exertion and palpitations.   Respiratory: Positive for shortness of breath. Negative for cough.    Skin: Negative for rash.   Musculoskeletal: Negative for joint pain.   Gastrointestinal: Negative for abdominal pain, nausea and vomiting.   Neurological: Negative for focal weakness and headaches.   Psychiatric/Behavioral: Negative for depression. The patient is not nervous/anxious.    All other systems reviewed and are negative.      History  Past Medical History:   Diagnosis Date   • CAD (coronary artery disease)    • GERD (gastroesophageal reflux disease)    • Hypertension    • Mood disorder (CMS/[facility])        History reviewed. No pertinent surgical history.    History reviewed. No pertinent family history.    Social  "History     Tobacco Use   • Smoking status: Never Smoker   • Smokeless tobacco: Never Used   Substance Use Topics   • Alcohol use: Not Currently   • Drug use: Never        Medications Prior to Admission   Medication Sig Dispense Refill Last Dose   • clonazePAM (KlonoPIN) 1 MG tablet Take 1 mg by mouth 2 (Two) Times a Day As Needed for Seizures.      • diphenoxylate-atropine (LOMOTIL) 2.5-0.025 MG per tablet Take 1 tablet by mouth 2 (Two) Times a Day.      • DULoxetine (CYMBALTA) 60 MG capsule Take 60 mg by mouth 2 (Two) Times a Day.      • gabapentin (NEURONTIN) 300 MG capsule Take 300 mg by mouth 3 (Three) Times a Day.      • lisinopril (PRINIVIL,ZESTRIL) 20 MG tablet Take 20 mg by mouth Daily.      • oxyCODONE (ROXICODONE) 10 MG tablet Take 10 mg by mouth Every 6 (Six) Hours As Needed for Moderate Pain .            Iodine; Naproxen; Methotrexate derivatives; Morphine; Sulfa antibiotics; and Sulfamethoxazole    Scheduled Meds:    diphenoxylate-atropine 1 tablet Oral BID   DULoxetine 60 mg Oral BID   enoxaparin 40 mg Subcutaneous Daily   gabapentin 300 mg Oral TID   lisinopril 20 mg Oral Daily   pantoprazole 40 mg Oral BID   sodium chloride 10 mL Intravenous Q12H     Continuous Infusions:    Pharmacy to Dose enoxaparin (LOVENOX)     sodium chloride 100 mL/hr Last Rate: 100 mL/hr (02/19/20 1033)     PRN Meds:.•  clonazePAM  •  ondansetron **OR** ondansetron  •  oxyCODONE  •  Pharmacy to Dose enoxaparin (LOVENOX)  •  promethazine  •  sodium chloride    Objective     VITAL SIGNS  Vitals:    02/19/20 1130 02/19/20 1200 02/19/20 1301 02/19/20 1500   BP:   110/58 101/50   Patient Position:       Pulse: 79 81 77 91   Resp:   16 22   Temp:   97.4 °F (36.3 °C) 97.5 °F (36.4 °C)   TempSrc:   Oral Oral   SpO2:   98% 100%   Weight:       Height:           Flowsheet Rows      First Filed Value   Admission Height  157.5 cm (62\") Documented at 02/18/2020 1900   Admission Weight  73.5 kg (162 lb 0.6 oz) Documented at 02/18/2020 " 1900           TELEMETRY: Sinus rhythm with nonspecific ST segment amenity    Physical Exam:  Physical Exam   Constitutional: She appears well-developed and well-nourished.   HENT:   Head: Normocephalic and atraumatic.   Eyes: Pupils are equal, round, and reactive to light. Conjunctivae and EOM are normal. No scleral icterus.   Neck: Normal range of motion. Neck supple. No JVD present. Carotid bruit is not present.   Cardiovascular: Normal rate, regular rhythm, S1 normal, S2 normal and intact distal pulses. PMI is not displaced.   Murmur heard.  Pulmonary/Chest: Effort normal and breath sounds normal. She has no wheezes. She has no rales.   Abdominal: Soft. Bowel sounds are normal.   Musculoskeletal: Normal range of motion.   Neurological: She is alert. She has normal strength.   No focal deficits   Skin: Skin is warm and dry. No rash noted.   Psychiatric: She has a normal mood and affect.        Results Review:   I reviewed the patient's new clinical results.  Lab Results (last 24 hours)     Procedure Component Value Units Date/Time    TSH [[id number]]  (Normal) Collected:  02/19/20 0031    Specimen:  Blood Updated:  02/19/20 0114     TSH 0.658 uIU/mL     Troponin [[id number]]  (Normal) Collected:  02/19/20 0031    Specimen:  Blood Updated:  02/19/20 0114     Troponin T <0.010 ng/mL     Narrative:       Troponin T Reference Range:  <= 0.03 ng/mL-   Negative for AMI  >0.03 ng/mL-     Abnormal for myocardial necrosis.  Clinicians would have to utilize clinical acumen, EKG, Troponin and serial changes to determine if it is an Acute Myocardial Infarction or myocardial injury due to an underlying chronic condition.       Results may be falsely decreased if patient taking Biotin.      Basic Metabolic Panel [[id number]]  (Abnormal) Collected:  02/19/20 0031    Specimen:  Blood Updated:  02/19/20 0111     Glucose 124 mg/dL      BUN 13 mg/dL      Creatinine 1.00 mg/dL      Sodium 140 mmol/L      Potassium 3.9 mmol/L       Chloride 106 mmol/L      CO2 25.0 mmol/L      Calcium 8.1 mg/dL      eGFR Non African Amer 55 mL/min/1.73      BUN/Creatinine Ratio 13.0     Anion Gap 9.0 mmol/L     Narrative:       GFR Normal >60  Chronic Kidney Disease <60  Kidney Failure <15      Lipid Panel [[id number]]  (Abnormal) Collected:  02/19/20 0031    Specimen:  Blood Updated:  02/19/20 0111     Total Cholesterol 146 mg/dL      Triglycerides 146 mg/dL      HDL Cholesterol 38 mg/dL      LDL Cholesterol  79 mg/dL      VLDL Cholesterol 29.2 mg/dL      LDL/HDL Ratio 2.07    Narrative:       Cholesterol Reference Ranges  (U.S. Department of Health and Human Services ATP III Classifications)    Desirable          <200 mg/dL  Borderline High    200-239 mg/dL  High Risk          >240 mg/dL      Triglyceride Reference Ranges  (U.S. Department of Health and Human Services ATP III Classifications)    Normal           <150 mg/dL  Borderline High  150-199 mg/dL  High             200-499 mg/dL  Very High        >500 mg/dL    HDL Reference Ranges  (U.S. Department of Health and Human Services ATP III Classifcations)    Low     <40 mg/dl (major risk factor for CHD)  High    >60 mg/dl ('negative' risk factor for CHD)        LDL Reference Ranges  (U.S. Department of Health and Human Services ATP III Classifcations)    Optimal          <100 mg/dL  Near Optimal     100-129 mg/dL  Borderline High  130-159 mg/dL  High             160-189 mg/dL  Very High        >189 mg/dL    CK [[id number]]  (Normal) Collected:  02/19/20 0031    Specimen:  Blood Updated:  02/19/20 0111     Creatine Kinase 40 U/L     CBC Auto Differential [[id number]]  (Abnormal) Collected:  02/19/20 0031    Specimen:  Blood Updated:  02/19/20 0104     WBC 3.10 10*3/mm3      RBC 3.63 10*6/mm3      Hemoglobin 11.8 g/dL      Hematocrit 33.3 %      MCV 91.7 fL      MCH 32.4 pg      MCHC 35.4 g/dL      RDW 12.4 %      RDW-SD 40.3 fl      MPV 8.2 fL      Platelets 214 10*3/mm3      Neutrophil % 38.0 %       Lymphocyte % 37.3 %      Monocyte % 13.3 %      Eosinophil % 9.6 %      Basophil % 1.8 %      Neutrophils, Absolute 1.20 10*3/mm3      Lymphocytes, Absolute 1.20 10*3/mm3      Monocytes, Absolute 0.40 10*3/mm3      Eosinophils, Absolute 0.30 10*3/mm3      Basophils, Absolute 0.10 10*3/mm3      nRBC 0.1 /100 WBC     Protime-INR [[id number]]  (Normal) Collected:  02/19/20 0031    Specimen:  Blood Updated:  02/19/20 0059     Protime 10.4 Seconds      INR 0.99    aPTT [[id number]]  (Normal) Collected:  02/19/20 0031    Specimen:  Blood Updated:  02/19/20 0059     PTT 25.6 seconds     Troponin [[id number]]  (Normal) Collected:  02/19/20 0031    Specimen:  Blood Updated:  02/19/20 0059     Troponin T <0.010 ng/mL     Narrative:       Troponin T Reference Range:  <= 0.03 ng/mL-   Negative for AMI  >0.03 ng/mL-     Abnormal for myocardial necrosis.  Clinicians would have to utilize clinical acumen, EKG, Troponin and serial changes to determine if it is an Acute Myocardial Infarction or myocardial injury due to an underlying chronic condition.       Results may be falsely decreased if patient taking Biotin.            Imaging Results (Last 24 Hours)     ** No results found for the last 24 hours. **          EKG      I personally viewed and interpreted the patient's EKG/Telemetry data:    ECHOCARDIOGRAM:      STRESS MYOVIEW:    CARDIAC CATHETERIZATION:    OTHER:         Assessment/Plan     Principal Problem:    Non-STEMI (non-ST elevated myocardial infarction) (CMS/[facility])  Active Problems:    Coronary artery disease    Depression    Essential hypertension    Gastroesophageal reflux disease    Hyperlipidemia    Irritable bowel syndrome    Psoriasis      Patient presented with shortness of breath and [facility] had a troponin was well within normal limits  Patient was placed on medical therapy and thereafter she underwent a stress my study which did not show any ischemia.  Patient's  blood pressure and heart rate stable  Patient also had an echocardiogram which showed normal LV systolic function with mild valvular heart disease  Patient is currently on medical therapy for hypertension  Patient's lipids are followed by the primary care doctor.  No further cardiac work-up at this time.    I discussed the patients findings and my recommendations with patient and nurse    Segundo Pinzon MD  02/19/20  6:42 PM

## 2020-02-19 NOTE — PROGRESS NOTES
Discharge Planning Assessment  [facility]     Patient Name: Katharina Brennan  MRN: [id number]  Today's Date: 2/19/2020    Admit Date: 2/18/2020    Discharge Needs Assessment     Row Name 02/19/20 1134       Living Environment    Lives With  alone    Current Living Arrangements  home/apartment/condo    Primary Care Provided by  self    Able to Return to Prior Arrangements  yes       Resource/Environmental Concerns    Transportation Concerns  car, none       Discharge Needs Assessment    Readmission Within the Last 30 Days  no previous admission in last 30 days    Equipment Currently Used at Home  none        Discharge Plan     Row Name 02/19/20 1133       Plan    Plan  D/C Plan : Home     Patient/Family in Agreement with Plan  yes    Plan Comments  Pt came in with chest pain and elevated trop. Pt is going for a stress test today .                  Row Name 02/19/20 1134       General Information    Admission Type  inpatient    Arrived From  emergency department    Required Notices Provided  Important Message from Medicare    Referral Source  emergency department    Preferred Language  English     Used During This Interaction  no        Functional Status     Row Name 02/19/20 1135       Functional Status    Usual Activity Tolerance  excellent       Functional Status, IADL    Medications  independent    Meal Preparation  independent    Housekeeping  independent    Laundry  independent    Shopping  independent       Mental Status    General Appearance WDL  WDL       Mental Status Summary    Recent Changes in Mental Status/Cognitive Functioning  no changes                       Linda Luque RN

## 2020-02-19 NOTE — PROGRESS NOTES
Continued Stay Note  [facility]     Patient Name: Katharina Brennan  MRN: [id number]  Today's Date: 2/19/2020    Admit Date: 2/18/2020    Discharge Plan     Row Name 02/19/20 1502       Plan    Plan  PT is recommending H/H . Pt is agreeable . new referral to [facility] . They are checking insurance . if able to accept will need orders placed         Discharge Codes    No documentation.             Linda Luque RN

## 2020-02-20 ENCOUNTER — APPOINTMENT (OUTPATIENT)
Dept: GENERAL RADIOLOGY | Facility: HOSPITAL | Age: 70
End: 2020-02-20

## 2020-02-20 LAB
B PERT DNA SPEC QL NAA+PROBE: NOT DETECTED
BASOPHILS # BLD AUTO: 0 10*3/MM3 (ref 0–0.2)
BASOPHILS NFR BLD AUTO: 0.4 % (ref 0–1.5)
C PNEUM DNA NPH QL NAA+NON-PROBE: NOT DETECTED
D-LACTATE SERPL-SCNC: 1.2 MMOL/L (ref 0.5–2)
DEPRECATED RDW RBC AUTO: 42.9 FL (ref 37–54)
EOSINOPHIL # BLD AUTO: 0.2 10*3/MM3 (ref 0–0.4)
EOSINOPHIL NFR BLD AUTO: 1.9 % (ref 0.3–6.2)
ERYTHROCYTE [DISTWIDTH] IN BLOOD BY AUTOMATED COUNT: 13 % (ref 12.3–15.4)
FLUAV H1 2009 PAND RNA NPH QL NAA+PROBE: NOT DETECTED
FLUAV H1 HA GENE NPH QL NAA+PROBE: NOT DETECTED
FLUAV H3 RNA NPH QL NAA+PROBE: NOT DETECTED
FLUAV SUBTYP SPEC NAA+PROBE: NOT DETECTED
FLUBV RNA ISLT QL NAA+PROBE: NOT DETECTED
HADV DNA SPEC NAA+PROBE: NOT DETECTED
HCOV 229E RNA SPEC QL NAA+PROBE: NOT DETECTED
HCOV HKU1 RNA SPEC QL NAA+PROBE: NOT DETECTED
HCOV NL63 RNA SPEC QL NAA+PROBE: NOT DETECTED
HCOV OC43 RNA SPEC QL NAA+PROBE: NOT DETECTED
HCT VFR BLD AUTO: 38.2 % (ref 34–46.6)
HGB BLD-MCNC: 12.7 G/DL (ref 12–15.9)
HMPV RNA NPH QL NAA+NON-PROBE: NOT DETECTED
HPIV1 RNA SPEC QL NAA+PROBE: NOT DETECTED
HPIV2 RNA SPEC QL NAA+PROBE: NOT DETECTED
HPIV3 RNA NPH QL NAA+PROBE: NOT DETECTED
HPIV4 P GENE NPH QL NAA+PROBE: NOT DETECTED
LYMPHOCYTES # BLD AUTO: 1.5 10*3/MM3 (ref 0.7–3.1)
LYMPHOCYTES NFR BLD AUTO: 15.5 % (ref 19.6–45.3)
M PNEUMO IGG SER IA-ACNC: NOT DETECTED
MCH RBC QN AUTO: 30.9 PG (ref 26.6–33)
MCHC RBC AUTO-ENTMCNC: 33.2 G/DL (ref 31.5–35.7)
MCV RBC AUTO: 93.1 FL (ref 79–97)
MONOCYTES # BLD AUTO: 0.5 10*3/MM3 (ref 0.1–0.9)
MONOCYTES NFR BLD AUTO: 4.8 % (ref 5–12)
NEUTROPHILS # BLD AUTO: 7.6 10*3/MM3 (ref 1.7–7)
NEUTROPHILS NFR BLD AUTO: 77.4 % (ref 42.7–76)
NRBC BLD AUTO-RTO: 0 /100 WBC (ref 0–0.2)
PLATELET # BLD AUTO: 234 10*3/MM3 (ref 140–450)
PMV BLD AUTO: 7.7 FL (ref 6–12)
PROCALCITONIN SERPL-MCNC: 0.12 NG/ML (ref 0.1–0.25)
RBC # BLD AUTO: 4.1 10*6/MM3 (ref 3.77–5.28)
RHINOVIRUS RNA SPEC NAA+PROBE: NOT DETECTED
RSV RNA NPH QL NAA+NON-PROBE: NOT DETECTED
WBC NRBC COR # BLD: 9.8 10*3/MM3 (ref 3.4–10.8)

## 2020-02-20 PROCEDURE — 25010000002 ENOXAPARIN PER 10 MG: Performed by: HOSPITALIST

## 2020-02-20 PROCEDURE — 97116 GAIT TRAINING THERAPY: CPT

## 2020-02-20 PROCEDURE — 25010000002 ONDANSETRON PER 1 MG: Performed by: NURSE PRACTITIONER

## 2020-02-20 PROCEDURE — 99232 SBSQ HOSP IP/OBS MODERATE 35: CPT | Performed by: HOSPITALIST

## 2020-02-20 PROCEDURE — 83605 ASSAY OF LACTIC ACID: CPT | Performed by: HOSPITALIST

## 2020-02-20 PROCEDURE — 0099U HC BIOFIRE FILMARRAY RESP PANEL 1: CPT | Performed by: HOSPITALIST

## 2020-02-20 PROCEDURE — 87040 BLOOD CULTURE FOR BACTERIA: CPT | Performed by: HOSPITALIST

## 2020-02-20 PROCEDURE — 25010000002 CEFTRIAXONE PER 250 MG: Performed by: HOSPITALIST

## 2020-02-20 PROCEDURE — 84145 PROCALCITONIN (PCT): CPT | Performed by: HOSPITALIST

## 2020-02-20 PROCEDURE — 71046 X-RAY EXAM CHEST 2 VIEWS: CPT

## 2020-02-20 PROCEDURE — 85025 COMPLETE CBC W/AUTO DIFF WBC: CPT | Performed by: HOSPITALIST

## 2020-02-20 PROCEDURE — 99232 SBSQ HOSP IP/OBS MODERATE 35: CPT | Performed by: INTERNAL MEDICINE

## 2020-02-20 RX ORDER — ACETAMINOPHEN 325 MG/1
650 TABLET ORAL EVERY 6 HOURS PRN
Status: DISCONTINUED | OUTPATIENT
Start: 2020-02-20 | End: 2020-02-21 | Stop reason: HOSPADM

## 2020-02-20 RX ADMIN — Medication 10 ML: at 20:04

## 2020-02-20 RX ADMIN — LISINOPRIL 20 MG: 20 TABLET ORAL at 08:33

## 2020-02-20 RX ADMIN — DOXYCYCLINE 100 MG: 100 INJECTION, POWDER, LYOPHILIZED, FOR SOLUTION INTRAVENOUS at 17:13

## 2020-02-20 RX ADMIN — ACETAMINOPHEN 650 MG: 325 TABLET, FILM COATED ORAL at 06:33

## 2020-02-20 RX ADMIN — OXYCODONE HYDROCHLORIDE 10 MG: 5 TABLET ORAL at 08:33

## 2020-02-20 RX ADMIN — DIPHENOXYLATE HYDROCHLORIDE AND ATROPINE SULFATE 1 TABLET: 2.5; .025 TABLET ORAL at 08:32

## 2020-02-20 RX ADMIN — SODIUM CHLORIDE 100 ML/HR: 900 INJECTION, SOLUTION INTRAVENOUS at 10:42

## 2020-02-20 RX ADMIN — PANTOPRAZOLE SODIUM 40 MG: 40 TABLET, DELAYED RELEASE ORAL at 20:04

## 2020-02-20 RX ADMIN — GABAPENTIN 300 MG: 300 CAPSULE ORAL at 08:32

## 2020-02-20 RX ADMIN — OXYCODONE HYDROCHLORIDE 10 MG: 5 TABLET ORAL at 16:26

## 2020-02-20 RX ADMIN — PANTOPRAZOLE SODIUM 40 MG: 40 TABLET, DELAYED RELEASE ORAL at 08:32

## 2020-02-20 RX ADMIN — ENOXAPARIN SODIUM 40 MG: 40 INJECTION SUBCUTANEOUS at 16:26

## 2020-02-20 RX ADMIN — GABAPENTIN 300 MG: 300 CAPSULE ORAL at 16:26

## 2020-02-20 RX ADMIN — DIPHENOXYLATE HYDROCHLORIDE AND ATROPINE SULFATE 1 TABLET: 2.5; .025 TABLET ORAL at 20:04

## 2020-02-20 RX ADMIN — DULOXETINE 60 MG: 30 CAPSULE, DELAYED RELEASE ORAL at 08:32

## 2020-02-20 RX ADMIN — Medication 10 ML: at 08:32

## 2020-02-20 RX ADMIN — SODIUM CHLORIDE 100 ML/HR: 900 INJECTION, SOLUTION INTRAVENOUS at 01:05

## 2020-02-20 RX ADMIN — DULOXETINE 60 MG: 30 CAPSULE, DELAYED RELEASE ORAL at 20:04

## 2020-02-20 RX ADMIN — GABAPENTIN 300 MG: 300 CAPSULE ORAL at 20:04

## 2020-02-20 RX ADMIN — CEFTRIAXONE SODIUM 1 G: 1 INJECTION, POWDER, FOR SOLUTION INTRAMUSCULAR; INTRAVENOUS at 19:36

## 2020-02-20 RX ADMIN — ONDANSETRON 4 MG: 2 INJECTION INTRAMUSCULAR; INTRAVENOUS at 02:53

## 2020-02-20 NOTE — THERAPY TREATMENT NOTE
Patient Name: Katharina Brennan  : 1950    MRN: [id number]                              Today's Date: 2020       Admit Date: 2020    Visit Dx: No diagnosis found.  Patient Active Problem List   Diagnosis   • Non-STEMI (non-ST elevated myocardial infarction) (CMS/HCC)   • Coronary artery disease   • Depression   • Essential hypertension   • Gastroesophageal reflux disease   • Hyperlipidemia   • Irritable bowel syndrome   • Other dorsalgia   • Psoriasis     Past Medical History:   Diagnosis Date   • CAD (coronary artery disease)    • GERD (gastroesophageal reflux disease)    • Hypertension    • Mood disorder (CMS/HCC)      History reviewed. No pertinent surgical history.  General Information    No documentation.       Mobility     Row Name 20 1315          Bed Mobility Assessment/Treatment    Bed Mobility Assessment/Treatment  bed mobility (all) activities  -     [place] Level (Bed Mobility)  conditional independence  -WC     Row Name 20 1315          Bed-Chair Transfer    Bed-Chair [place] (Transfers)  contact guard  -     Assistive Device (Bed-Chair Transfers)  walker, front-wheeled  -WC     Row Name 20 1315          Sit-Stand Transfer    Sit-Stand [place] (Transfers)  contact guard;1 person assist  -WC     Assistive Device (Sit-Stand Transfers)  walker, 4-wheeled  -WC     Row Name 20 1315          Gait/Stairs Assessment/Training    75128 - Gait Training Minutes   25  -WC     Gait/Stairs Assessment/Training  gait/ambulation independence  -     [place] Level (Gait)  contact guard  -     Assistive Device (Gait)  walker, 4-wheeled  -WC     Pattern (Gait)  step-to  -WC     Deviations/Abnormal Patterns (Gait)  base of support, wide  -WC     Left Sided Gait Deviations  heel strike decreased  -WC       User Key  (r) = Recorded By, (t) = Taken By, (c) = Cosigned By    Initials Name Provider Type    Radha Corcoran, PT Physical Therapist         Obj/Interventions     Row Name 02/20/20 1317          Static Sitting Balance    Level of [place] (Unsupported Sitting, Static Balance)  supervision  -     Sitting Position (Unsupported Sitting, Static Balance)  sitting on edge of bed  -     Time Able to Maintain Position (Unsupported Sitting, Static Balance)  more than 5 minutes  -     Row Name 02/20/20 1317          Dynamic Sitting Balance    Level of [place], Reaches Outside Midline (Sitting, Dynamic Balance)  supervision  -     Sitting Position, Reaches Outside Midline (Sitting, Dynamic Balance)  sitting on edge of bed;sitting in chair  -[facility] Name 02/20/20 1317          Static Standing Balance    Level of [place] (Supported Standing, Static Balance)  contact guard assist  -     Time Able to Maintain Position (Supported Standing, Static Balance)  3 to 4 minutes  -     Assistive Device Utilized (Supported Standing, Static Balance)  walker, rolling  -[facility] Name 02/20/20 1317          Dynamic Standing Balance    Level of [place], Reaches Outside Midline (Standing, Dynamic Balance)  contact guard assist  -WC     Time Able to Maintain Position, Reaches Outside Midline (Standing, Dynamic Balance)  3 to 4 minutes  -     Assistive Device Utilized (Supported Standing, Dynamic Balance)  walker, rolling  -       User Key  (r) = Recorded By, (t) = Taken By, (c) = Cosigned By    Initials Name Provider Type    Radha Corcoran PT Physical Therapist        Goals/Plan    No documentation.       Clinical Impression     [facility] Name 02/20/20 1319          Pain Scale: Numbers Pre/Post-Treatment    Pain Scale: Numbers, Pretreatment  0/10 - no pain  -     Pain Scale: Numbers, Post-Treatment  0/10 - no pain  -     Row Name 02/20/20 1319          Plan of Care Review    Plan of Care Reviewed With  patient  -[facility] Name 02/20/20 1319          Vital Signs    Pre Patient Position  Supine  -     Intra Patient Position  Standing  -      Post Patient Position  Sitting  -     Row Name 02/20/20 1319          Positioning and Restraints    Pre-Treatment Position  in bed  -WC     Post Treatment Position  chair  -WC     In Chair  notified nsg  -       User Key  (r) = Recorded By, (t) = Taken By, (c) = Cosigned By    Initials Name Provider Type    Radha Corcoran PT Physical Therapist        Outcome Measures     Row Name 02/20/20 1319          How much help from another person do you currently need...    Turning from your back to your side while in flat bed without using bedrails?  4  -WC     Moving from lying on back to sitting on the side of a flat bed without bedrails?  4  -WC     Moving to and from a bed to a chair (including a wheelchair)?  3  -WC     Standing up from a chair using your arms (e.g., wheelchair, bedside chair)?  3  -WC     Climbing 3-5 steps with a railing?  2  -WC     To walk in hospital room?  2  -WC     AM-PAC 6 Clicks Score (PT)  18  -     Row Name 02/20/20 1319          Functional Assessment    Outcome Measure Options  AM-PAC 6 Clicks Basic Mobility (PT)  -       User Key  (r) = Recorded By, (t) = Taken By, (c) = Cosigned By    Initials Name Provider Type    Radha Corcoran PT Physical Therapist        Physical Therapy Education                 Title: PT OT SLP Therapies (Done)     Topic: Physical Therapy (Done)     Point: Mobility training (Done)     Description:   Instruct learner(s) on safety and technique for assisting patient out of bed, chair or wheelchair.  Instruct in the proper use of assistive devices, such as walker, crutches, cane or brace.              Patient Friendly Description:   It's important to get you on your feet again, but we need to do so in a way that is safe for you. Falling has serious consequences, and your personal safety is the most important thing of all.        When it's time to get out of bed, one of us or a family member will sit next to you on the bed to give you support.     If  your doctor or nurse tells you to use a walker, crutches, a cane, or a brace, be sure you use it every time you get out of bed, even if you think you don't need it.    Learning Progress Summary           Patient Acceptance, E,TB, VU by  at 2/20/2020 1320    Acceptance, E,TB, VU by  at 2/19/2020 1154                   Point: Home exercise program (Done)     Description:   Instruct learner(s) on appropriate technique for monitoring, assisting and/or progressing patient with therapeutic exercises and activities.              Learning Progress Summary           Patient Acceptance, E,TB, VU by  at 2/20/2020 1320    Acceptance, E,TB, VU by WC at 2/19/2020 1154                   Point: Body mechanics (Done)     Description:   Instruct learner(s) on proper positioning and spine alignment for patient and/or caregiver during mobility tasks and/or exercises.              Learning Progress Summary           Patient Acceptance, E,TB, VU by  at 2/20/2020 1320    Acceptance, E,TB, VU by  at 2/19/2020 1154                   Point: Precautions (Done)     Description:   Instruct learner(s) on prescribed precautions during mobility and gait tasks              Learning Progress Summary           Patient Acceptance, E,TB, VU by  at 2/20/2020 1320    Acceptance, E,TB, VU by  at 2/19/2020 1154                               User Key     Initials Effective Dates Name Provider Type Discipline     01/07/20 -  Radha Paniagua, SALIMA Physical Therapist PT              PT Recommendation and Plan  Planned Therapy Interventions (PT Eval): balance training, gait training, home exercise program, patient/family education, strengthening  Outcome Summary/Treatment Plan (PT)  Anticipated Discharge Disposition (PT): home with home health  Plan of Care Reviewed With: patient  Outcome Summary: Pt was alert and oriented x 4 today. Pt trained transfers, gait 30 feet x 2 with FWW, CGA, SBA toileting, Pt trained in LE seated exercises to do on her  own. Improved endurance observed however still with c/o symptoms of vertigo.     Time Calculation:   PT Charges     Row Name 02/20/20 1321 02/20/20 1315          Time Calculation    Start Time  1215  -WC  --     Stop Time  1230  -WC  --     Time Calculation (min)  15 min  -WC  --     PT Received On  02/20/20  -WC  --     PT - Next Appointment  02/21/20  -  --        Time Calculation- PT    TCU Minutes- PT  15 min  -WC  --        Timed Charges    77006 - Gait Training Minutes   --  25  -WC       User Key  (r) = Recorded By, (t) = Taken By, (c) = Cosigned By    Initials Name Provider Type     Radha Paniagua, PT Physical Therapist        Therapy Charges for Today     Code Description Service Date Service Provider Modifiers Qty    85537143541 HC PT EVAL MOD COMPLEXITY 3 2/19/2020 Radha Paniagua, PT GP 1    87885016139 HC PT THER PROC EA 15 MIN 2/19/2020 Radha Paniagua, PT GP 1    61372495509 HC PT NEUROMUSC RE EDUCATION EA 15 MIN 2/19/2020 Radha Paniagua, PT GP 1    05435670060 HC GAIT TRAINING EA 15 MIN 2/19/2020 Radha Paniagua, PT GP 1    95467908809 HC GAIT TRAINING EA 15 MIN 2/20/2020 Radha Paniagua, PT GP 2          PT G-Codes  Outcome Measure Options: AM-PAC 6 Clicks Basic Mobility (PT)  AM-PAC 6 Clicks Score (PT): 18    Radha Paniagua PT  2/20/2020

## 2020-02-20 NOTE — DISCHARGE PLACEMENT REQUEST
"Katharina Brennan (69 y.o. Female)     Date of Birth Social Security Number Address Home Phone MRN    1950  [address] [address] [phone] [id number]    Oriental orthodox Marital Status          None Single       Admission Date Admission Type Admitting Provider Attending Provider Department, Room/Bed    2/18/20 Urgent Inge Kline MD Nallapuram, Divya, MD [facility], 2106/1    Discharge Date Discharge Disposition Discharge Destination                       Attending Provider:  Inge Kline MD    Allergies:  Iodine, Naproxen, Methotrexate Derivatives, Morphine, Sulfa Antibiotics, Sulfamethoxazole    Isolation:  None   Infection:  None   Code Status:  CPR    Ht:  157.5 cm (62\")   Wt:  74.4 kg (164 lb 0.4 oz)    Admission Cmt:  None   Principal Problem:  Non-STEMI (non-ST elevated myocardial infarction) (CMS/[facility]) [I21.4]                 Active Insurance as of 2/18/2020     Primary Coverage     Payor Plan Insurance Group Employer/Plan Group    [facility] MEDICARE REPLACEMENT [facility] MEDICARE REPLACEMENT [id number]     Payor Plan Address Payor Plan Phone Number Payor Plan Fax Number Effective Dates    [address] [address]   1/1/2020 - None Entered    [facility] [id number]       Subscriber Name Subscriber Birth Date Member ID       KATHARINA BRENNAN 1950 [id number]                 Emergency Contacts      (Rel.) Home Phone Work Phone Mobile Phone    EMJACQUES (Relative) [phone] -- [phone]              "

## 2020-02-20 NOTE — PLAN OF CARE
Problem: Patient Care Overview  Goal: Plan of Care Review  Outcome: Ongoing (interventions implemented as appropriate)  Flowsheets (Taken 2/20/2020 1327)  Outcome Summary: Pt was alert and oriented x 4 today. Pt trained transfers, gait 30 feet x 2 with FWW, CGA, SBA toileting, Pt trained in LE seated exercises to do on her own. Improved endurance observed however still with c/o symptoms of vertigo.

## 2020-02-20 NOTE — PROGRESS NOTES
"      [facility] Services Daily Progress Note      Hospitalist Team  LOS 2 days      Patient Care Team:  Gregory Gallo MD as PCP - General  Gregory Gallo MD as PCP - Family Medicine    Patient Location: 2106/1      Subjective   Subjective     Chief Complaint / Subjective      Present on Admission:  • Non-STEMI (non-ST elevated myocardial infarction) (CMS/[facility])  • Coronary artery disease  • Depression  • Essential hypertension  • Gastroesophageal reflux disease  • Hyperlipidemia  • Irritable bowel syndrome  • Psoriasis    Continuing to have nausea and vomiting after eating, requesting GI consult, however has not been taking her protonix consistently at home     Review of Systems   Constitution: Negative for chills, decreased appetite, diaphoresis, fever, malaise/fatigue, night sweats, weight gain and weight loss.   Respiratory: Negative for cough, hemoptysis, shortness of breath, sleep disturbances due to breathing, snoring, sputum production and wheezing.    Gastrointestinal: Positive for heartburn and nausea. Negative for bloating, abdominal pain, anorexia, change in bowel habit, bowel incontinence, constipation, diarrhea, dysphagia, excessive appetite, flatus, hematemesis, hematochezia, hemorrhoids, jaundice and melena.       Objective   Objective      Vital Signs  Temp:  [97.4 °F (36.3 °C)-101.4 °F (38.6 °C)] 99.8 °F (37.7 °C)  Heart Rate:  [71-95] 95  Resp:  [16-22] 18  BP: (101-131)/(46-83) 131/83  Oxygen Therapy  SpO2: 97 %  Pulse Oximetry Type: Intermittent  Device (Oxygen Therapy): room air  Flowsheet Rows      First Filed Value   Admission Height  157.5 cm (62\") Documented at 02/18/2020 1900   Admission Weight  73.5 kg (162 lb 0.6 oz) Documented at 02/18/2020 1900        Intake & Output (last 3 days)       02/17 0701 - 02/18 0700 02/18 0701 - 02/19 0700 02/19 0701 - 02/20 0700 02/20 0701 - 02/21 0700    P.O.  480 1200     Total Intake(mL/kg)  480 (6.5) 1200 " (16.1)     Urine (mL/kg/hr)  1400 300 (0.2)     Stool  0      Total Output  1400 300     Net  -920 +900             Urine Unmeasured Occurrence  1 x 1 x     Stool Unmeasured Occurrence  1 x          Lines, Drains & Airways    Active LDAs     Name:   Placement date:   Placement time:   Site:   Days:    Peripheral IV 02/19/20 2000 Left;Posterior Forearm   02/19/20 2000    Forearm   less than 1                  Physical Exam:    Physical Exam   Constitutional: She is oriented to person, place, and time. She appears well-developed and well-nourished. No distress.   HENT:   Head: Normocephalic and atraumatic.   Nose: Nose normal.   Mouth/Throat: Oropharynx is clear and moist. No oropharyngeal exudate.   Eyes: Pupils are equal, round, and reactive to light. Conjunctivae and EOM are normal. Right eye exhibits no discharge. Left eye exhibits no discharge.   Neck: Normal range of motion. Neck supple. No tracheal deviation present.   Cardiovascular: Normal rate, regular rhythm and normal heart sounds. Exam reveals no gallop and no friction rub.   No murmur heard.  Pulmonary/Chest: Effort normal and breath sounds normal. No stridor. No respiratory distress. She has no wheezes. She has no rales. She exhibits tenderness.   Abdominal: Soft. Bowel sounds are normal. She exhibits no distension. There is no tenderness. There is no guarding.   Musculoskeletal: Normal range of motion. She exhibits no edema, tenderness or deformity.   Neurological: She is alert and oriented to person, place, and time. No cranial nerve deficit.   Skin: Skin is warm and dry. No rash noted. She is not diaphoretic. No erythema. No pallor.   Psychiatric: She has a normal mood and affect. Her behavior is normal. Judgment and thought content normal.         Procedures:              Results Review:     I reviewed the patient's new clinical results.      Lab Results (last 24 hours)     ** No results found for the last 24 hours. **        No results found  for: HGBA1C  Results from last 7 days   Lab Units 02/19/20  0031   INR  0.99               Microbiology Results (last 10 days)     ** No results found for the last 240 hours. **          ECG/EMG Results (most recent)     None                    No radiology results for the last 7 days    Xrays, labs reviewed personally by physician.    Medication Review:   I have reviewed the patient's current medication list      Scheduled Meds    diphenoxylate-atropine 1 tablet Oral BID   DULoxetine 60 mg Oral BID   enoxaparin 40 mg Subcutaneous Daily   gabapentin 300 mg Oral TID   lisinopril 20 mg Oral Daily   pantoprazole 40 mg Oral BID   sodium chloride 10 mL Intravenous Q12H       Meds Infusions    Pharmacy to Dose enoxaparin (LOVENOX)     sodium chloride 100 mL/hr Last Rate: 100 mL/hr (02/20/20 0105)       Meds PRN  •  acetaminophen  •  clonazePAM  •  ondansetron **OR** ondansetron  •  oxyCODONE  •  Pharmacy to Dose enoxaparin (LOVENOX)  •  promethazine  •  sodium chloride    Assessment/Plan   Assessment/Plan     Active Hospital Problems    Diagnosis  POA   • **Non-STEMI (non-ST elevated myocardial infarction) (CMS/[facility]) [I21.4]  Yes   • Coronary artery disease [I25.10]  Yes   • Depression [F32.9]  Yes   • Gastroesophageal reflux disease [K21.9]  Yes   • Hyperlipidemia [E78.5]  Yes   • Essential hypertension [I10]  Yes   • Irritable bowel syndrome [K58.9]  Yes   • Psoriasis [L40.9]  Yes      Resolved Hospital Problems   No resolved problems to display.     Non-STEMI, troponin I increased from 0.06-0.11: Diagnosed with NSTEMI on admission due to elevation of troponin at OSH, troponin remains stable here in the hospital, and stress test negative.  --Stress test negative   --Cardiology consulted, appreciate recs     GERD- had episode of nausea/vomiting after eating, reports not consistently taking PPI at home. No hx of scopes.   --Increase PPI to BID   --PRNs on board for nausea      Generalized weakness with nausea vomiting and  night sweats  --Patient able to independently perform ADLs      Renal injury, improving, creatinine 1.07 which was improved from 1.11--likely secondary to the azotemia from volume loss due to vomiting and night: IV fluids; BMP in a.m.    --Potassium 4.3; sodium 139, magnesium 1.8 at prior facility     Hypertension, chronic, controlled: Continue lisinopril     Anxiety, chronic: Continue clonazepam, Cymbalta     IBS with primary diarrhea: Continue Lomotil      Arthritis with chronic pain: Continue oxycodone; continue gabapentin Psoriasis--with prior treatment failure Humira and allergy to methotrexate: Add Aquaphor    VTE Prophylaxis - SCDs.      Code Status -   Code Status and Medical Interventions:   Ordered at: 02/18/20 7081     Code Status:    CPR     Medical Interventions (Level of Support Prior to Arrest):    Full       Discharge Planning    Destination      Coordination has not been started for this encounter.      Durable Medical Equipment      Coordination has not been started for this encounter.      Dialysis/Infusion      Coordination has not been started for this encounter.      Home Medical Care      Service Provider Request Status Selected Services Address Phone Number Fax Number    [facility] Pending - Request Sent N/A [address] [place] [address] [phone]      Therapy      Coordination has not been started for this encounter.      Community Resources      Coordination has not been started for this encounter.            Electronically signed by Inge Kline MD, 02/20/20, 9:55 AM.  [facility] Hospitalist Team

## 2020-02-20 NOTE — PROGRESS NOTES
"Referring Provider: Hospitalist    Reason for follow-up: Follow-up coronary disease     Patient Care Team:  Gregory Gallo MD as PCP - General  Gregory Gallo MD as PCP - Family Medicine    Subjective .  No chest pain or shortness of breath    Objective  Lying in bed comfortably     Review of Systems   Constitution: Negative for fever and malaise/fatigue.   Cardiovascular: Negative for chest pain, dyspnea on exertion and palpitations.   Respiratory: Negative for cough and shortness of breath.    Skin: Negative for rash.   Gastrointestinal: Negative for abdominal pain, nausea and vomiting.   Neurological: Negative for focal weakness and headaches.   All other systems reviewed and are negative.      Iodine; Naproxen; Methotrexate derivatives; Morphine; Sulfa antibiotics; and Sulfamethoxazole    Scheduled Meds:    diphenoxylate-atropine 1 tablet Oral BID   DULoxetine 60 mg Oral BID   enoxaparin 40 mg Subcutaneous Daily   gabapentin 300 mg Oral TID   lisinopril 20 mg Oral Daily   pantoprazole 40 mg Oral BID   sodium chloride 10 mL Intravenous Q12H     Continuous Infusions:    Pharmacy to Dose enoxaparin (LOVENOX)     sodium chloride 100 mL/hr Last Rate: 100 mL/hr (02/20/20 1042)     PRN Meds:.•  acetaminophen  •  clonazePAM  •  ondansetron **OR** ondansetron  •  oxyCODONE  •  Pharmacy to Dose enoxaparin (LOVENOX)  •  promethazine  •  sodium chloride        VITAL SIGNS  Vitals:    02/20/20 0612 02/20/20 0823 02/20/20 1022 02/20/20 1440   BP: 131/83  129/57 104/45   Patient Position: Lying      Pulse: 95  95 89   Resp: 18  16 16   Temp: (!) 101.4 °F (38.6 °C) 99.8 °F (37.7 °C) 99.1 °F (37.3 °C) 98.4 °F (36.9 °C)   TempSrc: Oral Oral Oral Oral   SpO2: 97%  96% 95%   Weight:       Height:           Flowsheet Rows      First Filed Value   Admission Height  157.5 cm (62\") Documented at 02/18/2020 1900   Admission Weight  73.5 kg (162 lb 0.6 oz) Documented at 02/18/2020 1900           TELEMETRY: Sinus " "rhythm with nonspecific ST segment amenity    Physical Exam:  Physical Exam   Constitutional: She appears well-developed and well-nourished.   HENT:   Head: Normocephalic and atraumatic.   Eyes: Conjunctivae are normal. No scleral icterus.   Neck: Normal range of motion. Neck supple. No JVD present. Carotid bruit is not present.   Cardiovascular: Normal rate, regular rhythm, S1 normal, S2 normal, normal heart sounds and intact distal pulses. PMI is not displaced.   Pulmonary/Chest: Effort normal and breath sounds normal. She has no wheezes. She has no rales.   Abdominal: Soft. Bowel sounds are normal.   Neurological: She is alert. She has normal strength.   Skin: Skin is warm and dry. No rash noted.        Results Review:   I reviewed the patient's new clinical results.  Lab Results (last 24 hours)     Procedure Component Value Units Date/Time    Procalcitonin [[id number]]  (Normal) Collected:  02/20/20 1419    Specimen:  Blood Updated:  02/20/20 1519     Procalcitonin 0.12 ng/mL     Narrative:       As a Marker for Sepsis (Non-Neonates):   1. <0.5 ng/mL represents a low risk of severe sepsis and/or septic shock.  1. >2 ng/mL represents a high risk of severe sepsis and/or septic shock.    As a Marker for Lower Respiratory Tract Infections that require antibiotic therapy:  PCT on Admission     Antibiotic Therapy             6-12 Hrs later  > 0.5                Strongly Recommended            >0.25 - <0.5         Recommended  0.1 - 0.25           Discouraged                   Remeasure/reassess PCT  <0.1                 Strongly Discouraged          Remeasure/reassess PCT      As 28 day mortality risk marker: \"Change in Procalcitonin Result\" (> 80 % or <=80 %) if Day 0 (or Day 1) and Day 4 values are available. Refer to http://www.Myfacepages-pct-calculator.com/   Change in PCT <=80 %   A decrease of PCT levels below or equal to 80 % defines a positive change in PCT test result representing a higher risk for 28-day " all-cause mortality of patients diagnosed with severe sepsis or septic shock.  Change in PCT > 80 %   A decrease of PCT levels of more than 80 % defines a negative change in PCT result representing a lower risk for 28-day all-cause mortality of patients diagnosed with severe sepsis or septic shock.                Results may be falsely decreased if patient taking Biotin.     CBC & Differential [[id number]] Collected:  02/20/20 1419    Specimen:  Blood Updated:  02/20/20 1517    Narrative:       The following orders were created for panel order CBC & Differential.  Procedure                               Abnormality         Status                     ---------                               -----------         ------                     CBC Auto Differential[[id number]]        Abnormal            Final result                 Please view results for these tests on the individual orders.    CBC Auto Differential [[id number]]  (Abnormal) Collected:  02/20/20 1419    Specimen:  Blood Updated:  02/20/20 1517     WBC 9.80 10*3/mm3      RBC 4.10 10*6/mm3      Hemoglobin 12.7 g/dL      Hematocrit 38.2 %      MCV 93.1 fL      MCH 30.9 pg      MCHC 33.2 g/dL      RDW 13.0 %      RDW-SD 42.9 fl      MPV 7.7 fL      Platelets 234 10*3/mm3      Neutrophil % 77.4 %      Lymphocyte % 15.5 %      Monocyte % 4.8 %      Eosinophil % 1.9 %      Basophil % 0.4 %      Neutrophils, Absolute 7.60 10*3/mm3      Lymphocytes, Absolute 1.50 10*3/mm3      Monocytes, Absolute 0.50 10*3/mm3      Eosinophils, Absolute 0.20 10*3/mm3      Basophils, Absolute 0.00 10*3/mm3      nRBC 0.0 /100 WBC     Lactic Acid, Plasma [[id number]]  (Normal) Collected:  02/20/20 1419    Specimen:  Blood Updated:  02/20/20 1507     Lactate 1.2 mmol/L     Respiratory Panel, PCR - Swab, Nasopharynx [[id number]] Collected:  02/20/20 1438    Specimen:  Swab from Nasopharynx Updated:  02/20/20 1442    Blood Culture - Blood, Hand, Left [[id number]] Collected:  02/20/20 1438     Specimen:  Blood from Hand, Left Updated:  02/20/20 1437    Blood Culture - Blood, Wrist, Right [[id number]] Collected:  02/20/20 1419    Specimen:  Blood from Wrist, Right Updated:  02/20/20 1437          Imaging Results (Last 24 Hours)     Procedure Component Value Units Date/Time    XR Chest PA & Lateral [[id number]] Collected:  02/20/20 1552     Updated:  02/20/20 1556    Narrative:       Examination: XR CHEST PA AND LATERAL-     Date of Exam: 2/20/2020 3:40 PM     Indication: Fever.  Nonsmoker, history of hypertension and psoriasis      Comparison: None available.     Technique: 2 radiographic views of the chest were obtained.     Findings:  Questionable mild patchy airspace changes are present within the left  lung base. No pleural effusions. No pneumothorax. The heart size is  normal. The pulmonary vasculature appears within normal limits. No acute  osseous abnormality identified.       Impression:       Questionable mild patchy airspace changes within the left lung base  which may be related to a mild pneumonia.     Electronically Signed By-Gabriella Ritchie On:2/20/2020 3:52 PM  This report was finalized on [id number] by  Gabriella Ritchie, .          EKG      I personally viewed and interpreted the patient's EKG/Telemetry data:    ECHOCARDIOGRAM:    STRESS MYOVIEW:    CARDIAC CATHETERIZATION:    OTHER:         Assessment/Plan     Principal Problem:    Non-STEMI (non-ST elevated myocardial infarction) (CMS/[facility])  Active Problems:    Coronary artery disease    Depression    Essential hypertension    Gastroesophageal reflux disease    Hyperlipidemia    Irritable bowel syndrome    Psoriasis       Patient is ruled out for MI by EKG enzymes  Patient had a stress Myoview did not show any ischemia  Blood pressure and heart rate stable  Continue current medicines and follow her as an outpatient    I discussed the patients findings and my recommendations with patient and nurse    Segundo Pinzon MD  02/20/20  4:15  PM

## 2020-02-20 NOTE — SIGNIFICANT NOTE
02/20/20 1319   Pastoral/Spiritual Care   Pastoral Care Visit Type initial   Pastoral Care Source patient request (describe)  (consult)   Receptivity to Spiritual Care visit welcomed   Pastoral Care Request coping/stress of illness support;loss support;mood/anxiety support;prayer support;spiritual/moral support   Pastoral Care Interventions prayer support provided;supportive conversation provided;theological discussion provided   Pastoral Care Response emotion expressed;engaged in conversation;thanks expressed   Use of Spiritual Resources prayer   Pastoral Care Follow-Up follow-up, none required as presently assessed  (pt will call if return visit needed)     Pt sad and still grieving the loss (murder) of her pregnant daughter over 30 years ago.  was empathically listening as pt was tearful and reminisced. Pt processed her current living situation with niece and the emotions involved with that. Pt asked for prayer when finished and was thankful for 's presence and listening. No other needs.

## 2020-02-20 NOTE — PROGRESS NOTES
Continued Stay Note   [facility]     Patient Name: Katharina Brennan  MRN: [id number]  Today's Date: 2/20/2020    Admit Date: 2/18/2020    Discharge Plan     Row Name 02/20/20 1318       Plan    Plan  Home with [facility]- accepted and no order required per liasion.     Patient/Family in Agreement with Plan  yes    Plan Comments  Met with patient at bedside, informed [facility]  unable to accept, she is agreeable to any home health that will accept her insurance. Per Val at [facility]  they can accept patient and she does not need an order, she will obtain this from patients PCP. DC barriers: febrile today, IVF            Mayuri Garner RN

## 2020-02-21 VITALS
OXYGEN SATURATION: 100 % | DIASTOLIC BLOOD PRESSURE: 64 MMHG | WEIGHT: 168.21 LBS | HEART RATE: 81 BPM | RESPIRATION RATE: 14 BRPM | HEIGHT: 62 IN | TEMPERATURE: 98.1 F | BODY MASS INDEX: 30.95 KG/M2 | SYSTOLIC BLOOD PRESSURE: 127 MMHG

## 2020-02-21 PROCEDURE — 99238 HOSP IP/OBS DSCHRG MGMT 30/<: CPT | Performed by: HOSPITALIST

## 2020-02-21 PROCEDURE — 99232 SBSQ HOSP IP/OBS MODERATE 35: CPT | Performed by: INTERNAL MEDICINE

## 2020-02-21 RX ORDER — CEFDINIR 300 MG/1
300 CAPSULE ORAL EVERY 12 HOURS SCHEDULED
Qty: 10 CAPSULE | Refills: 0 | Status: SHIPPED | OUTPATIENT
Start: 2020-02-21 | End: 2020-02-26

## 2020-02-21 RX ORDER — DOXYCYCLINE 100 MG/1
100 TABLET ORAL EVERY 12 HOURS SCHEDULED
Qty: 10 TABLET | Refills: 0 | Status: SHIPPED | OUTPATIENT
Start: 2020-02-21 | End: 2020-02-26

## 2020-02-21 RX ORDER — PANTOPRAZOLE SODIUM 40 MG/1
40 TABLET, DELAYED RELEASE ORAL 2 TIMES DAILY
Qty: 60 TABLET | Refills: 2 | Status: SHIPPED | OUTPATIENT
Start: 2020-02-21

## 2020-02-21 RX ORDER — DOXYCYCLINE 100 MG/1
100 TABLET ORAL EVERY 12 HOURS SCHEDULED
Status: DISCONTINUED | OUTPATIENT
Start: 2020-02-21 | End: 2020-02-21 | Stop reason: HOSPADM

## 2020-02-21 RX ORDER — CEFDINIR 300 MG/1
300 CAPSULE ORAL EVERY 12 HOURS SCHEDULED
Status: DISCONTINUED | OUTPATIENT
Start: 2020-02-21 | End: 2020-02-21 | Stop reason: HOSPADM

## 2020-02-21 RX ADMIN — GABAPENTIN 300 MG: 300 CAPSULE ORAL at 09:12

## 2020-02-21 RX ADMIN — DOXYCYCLINE 100 MG: 100 TABLET, FILM COATED ORAL at 11:19

## 2020-02-21 RX ADMIN — Medication 10 ML: at 09:13

## 2020-02-21 RX ADMIN — DIPHENOXYLATE HYDROCHLORIDE AND ATROPINE SULFATE 1 TABLET: 2.5; .025 TABLET ORAL at 09:12

## 2020-02-21 RX ADMIN — PANTOPRAZOLE SODIUM 40 MG: 40 TABLET, DELAYED RELEASE ORAL at 09:13

## 2020-02-21 RX ADMIN — SODIUM CHLORIDE 100 ML/HR: 900 INJECTION, SOLUTION INTRAVENOUS at 01:55

## 2020-02-21 RX ADMIN — DULOXETINE 60 MG: 30 CAPSULE, DELAYED RELEASE ORAL at 09:12

## 2020-02-21 RX ADMIN — LISINOPRIL 20 MG: 20 TABLET ORAL at 09:12

## 2020-02-21 NOTE — PLAN OF CARE
Patient had a stress test 2/20 that was negative. Chest xray revealed mild pneumonia. Doxycycline and rocephin started. IV fluids infusing. Patient is on room air and has a dry nonproductive cough. She is ambulating with one assist to the bathroom. Lives at home with her niece and has a home health nurse .

## 2020-02-21 NOTE — PROGRESS NOTES
"      [facility] Medicine Services Daily Progress Note      Hospitalist Team  LOS 3 days      Patient Care Team:  Gregory Gallo MD as PCP - General  Gregory Gallo MD as PCP - Family Medicine    Patient Location: 2106/1      Subjective   Subjective     Chief Complaint / Subjective  Fatigue    Present on Admission:  • Non-STEMI (non-ST elevated myocardial infarction) (CMS/HCC)  • Coronary artery disease  • Depression  • Essential hypertension  • Gastroesophageal reflux disease  • Hyperlipidemia  • Irritable bowel syndrome  • Psoriasis    Spiked fever, states she is \"feeling bad,\" has non-productive cough    Review of Systems   Constitution: Positive for decreased appetite, fever and malaise/fatigue. Negative for chills, diaphoresis, night sweats, weight gain and weight loss.   Cardiovascular: Negative for chest pain, claudication, cyanosis, dyspnea on exertion, irregular heartbeat, leg swelling, near-syncope, orthopnea, palpitations, paroxysmal nocturnal dyspnea and syncope.   Respiratory: Positive for cough. Negative for hemoptysis, shortness of breath, sleep disturbances due to breathing, snoring, sputum production and wheezing.         Objective   Objective      Vital Signs  Temp:  [98.1 °F (36.7 °C)-98.8 °F (37.1 °C)] 98.1 °F (36.7 °C)  Heart Rate:  [81-89] 81  Resp:  [12-18] 14  BP: (104-127)/(45-64) 127/64  Oxygen Therapy  SpO2: 100 %  Pulse Oximetry Type: Intermittent  Device (Oxygen Therapy): room air  Flowsheet Rows      First Filed Value   Admission Height  157.5 cm (62\") Documented at 02/18/2020 1900   Admission Weight  73.5 kg (162 lb 0.6 oz) Documented at 02/18/2020 1900        Intake & Output (last 3 days)       02/18 0701 - 02/19 0700 02/19 0701 - 02/20 0700 02/20 0701 - 02/21 0700 02/21 0701 - 02/22 0700    P.O. 480 1200 720     IV Piggyback   100     Total Intake(mL/kg) 480 (6.5) 1200 (16.1) 820 (10.7)     Urine (mL/kg/hr) 1400 300 (0.2) 2100 (1.1) 600 (1.8) "    Stool 0       Total Output 4782 655 0152 600    Net -920 +900 -1280 -600            Urine Unmeasured Occurrence 1 x 1 x 1 x     Stool Unmeasured Occurrence 1 x           Lines, Drains & Airways    Active LDAs     None                  Physical Exam:    Physical Exam   Constitutional: She is oriented to person, place, and time. She appears well-developed and well-nourished. No distress.   HENT:   Head: Normocephalic and atraumatic.   Nose: Nose normal.   Mouth/Throat: Oropharynx is clear and moist. No oropharyngeal exudate.   Eyes: Pupils are equal, round, and reactive to light. Conjunctivae and EOM are normal. Right eye exhibits no discharge. Left eye exhibits no discharge.   Neck: Normal range of motion. Neck supple. No tracheal deviation present.   Cardiovascular: Normal rate, regular rhythm and normal heart sounds. Exam reveals no gallop and no friction rub.   No murmur heard.  Pulmonary/Chest: Effort normal and breath sounds normal. No stridor. No respiratory distress. She has no wheezes. She has no rales. She exhibits no tenderness.   Abdominal: Soft. Bowel sounds are normal. She exhibits no distension. There is no tenderness. There is no guarding.   Musculoskeletal: Normal range of motion. She exhibits no edema, tenderness or deformity.   Neurological: She is alert and oriented to person, place, and time. No cranial nerve deficit.   Skin: Skin is warm and dry. No rash noted. She is not diaphoretic. No erythema. No pallor.   Psychiatric: She has a normal mood and affect. Her behavior is normal. Judgment and thought content normal.     Procedures:              Results Review:     I reviewed the patient's new clinical results.      Lab Results (last 24 hours)     Procedure Component Value Units Date/Time    Blood Culture - Blood, Hand, Left [[id number]] Collected:  02/20/20 1433    Specimen:  Blood from Hand, Left Updated:  02/21/20 0245     Blood Culture No growth at less than 24 hours    Blood Culture -  "Blood, Wrist, Right [[id number]] Collected:  02/20/20 1419    Specimen:  Blood from Wrist, Right Updated:  02/21/20 0245     Blood Culture No growth at less than 24 hours    Respiratory Panel, PCR - Swab, Nasopharynx [[id number]]  (Normal) Collected:  02/20/20 1438    Specimen:  Swab from Nasopharynx Updated:  02/20/20 1655     ADENOVIRUS, PCR Not Detected     Coronavirus 229E Not Detected     Coronavirus HKU1 Not Detected     Coronavirus NL63 Not Detected     Coronavirus OC43 Not Detected     Human Metapneumovirus Not Detected     Human Rhinovirus/Enterovirus Not Detected     Influenza B PCR Not Detected     Parainfluenza Virus 1 Not Detected     Parainfluenza Virus 2 Not Detected     Parainfluenza Virus 3 Not Detected     Parainfluenza Virus 4 Not Detected     Bordetella pertussis pcr Not Detected     Influenza A H1 2009 PCR Not Detected     Chlamydophila pneumoniae PCR Not Detected     Mycoplasma pneumo by PCR Not Detected     Influenza A PCR Not Detected     Influenza A H3 Not Detected     Influenza A H1 Not Detected     RSV, PCR Not Detected    Procalcitonin [[id number]]  (Normal) Collected:  02/20/20 1419    Specimen:  Blood Updated:  02/20/20 1519     Procalcitonin 0.12 ng/mL     Narrative:       As a Marker for Sepsis (Non-Neonates):   1. <0.5 ng/mL represents a low risk of severe sepsis and/or septic shock.  1. >2 ng/mL represents a high risk of severe sepsis and/or septic shock.    As a Marker for Lower Respiratory Tract Infections that require antibiotic therapy:  PCT on Admission     Antibiotic Therapy             6-12 Hrs later  > 0.5                Strongly Recommended            >0.25 - <0.5         Recommended  0.1 - 0.25           Discouraged                   Remeasure/reassess PCT  <0.1                 Strongly Discouraged          Remeasure/reassess PCT      As 28 day mortality risk marker: \"Change in Procalcitonin Result\" (> 80 % or <=80 %) if Day 0 (or Day 1) and Day 4 values are available. Refer " to http://www.[facility]-pct-calculator.com/   Change in PCT <=80 %   A decrease of PCT levels below or equal to 80 % defines a positive change in PCT test result representing a higher risk for 28-day all-cause mortality of patients diagnosed with severe sepsis or septic shock.  Change in PCT > 80 %   A decrease of PCT levels of more than 80 % defines a negative change in PCT result representing a lower risk for 28-day all-cause mortality of patients diagnosed with severe sepsis or septic shock.                Results may be falsely decreased if patient taking Biotin.     CBC & Differential [[id number]] Collected:  02/20/20 1419    Specimen:  Blood Updated:  02/20/20 1517    Narrative:       The following orders were created for panel order CBC & Differential.  Procedure                               Abnormality         Status                     ---------                               -----------         ------                     CBC Auto Differential[[id number]]        Abnormal            Final result                 Please view results for these tests on the individual orders.    CBC Auto Differential [[id number]]  (Abnormal) Collected:  02/20/20 1419    Specimen:  Blood Updated:  02/20/20 1517     WBC 9.80 10*3/mm3      RBC 4.10 10*6/mm3      Hemoglobin 12.7 g/dL      Hematocrit 38.2 %      MCV 93.1 fL      MCH 30.9 pg      MCHC 33.2 g/dL      RDW 13.0 %      RDW-SD 42.9 fl      MPV 7.7 fL      Platelets 234 10*3/mm3      Neutrophil % 77.4 %      Lymphocyte % 15.5 %      Monocyte % 4.8 %      Eosinophil % 1.9 %      Basophil % 0.4 %      Neutrophils, Absolute 7.60 10*3/mm3      Lymphocytes, Absolute 1.50 10*3/mm3      Monocytes, Absolute 0.50 10*3/mm3      Eosinophils, Absolute 0.20 10*3/mm3      Basophils, Absolute 0.00 10*3/mm3      nRBC 0.0 /100 WBC     Lactic Acid, Plasma [[id number]]  (Normal) Collected:  02/20/20 1419    Specimen:  Blood Updated:  02/20/20 1507     Lactate 1.2 mmol/L         No results found for:  HGBA1C  Results from last 7 days   Lab Units 02/19/20  0031   INR  0.99               Microbiology Results (last 10 days)     Procedure Component Value - Date/Time    Respiratory Panel, PCR - Swab, Nasopharynx [[id number]]  (Normal) Collected:  02/20/20 1438    Lab Status:  Final result Specimen:  Swab from Nasopharynx Updated:  02/20/20 1655     ADENOVIRUS, PCR Not Detected     Coronavirus 229E Not Detected     Coronavirus HKU1 Not Detected     Coronavirus NL63 Not Detected     Coronavirus OC43 Not Detected     Human Metapneumovirus Not Detected     Human Rhinovirus/Enterovirus Not Detected     Influenza B PCR Not Detected     Parainfluenza Virus 1 Not Detected     Parainfluenza Virus 2 Not Detected     Parainfluenza Virus 3 Not Detected     Parainfluenza Virus 4 Not Detected     Bordetella pertussis pcr Not Detected     Influenza A H1 2009 PCR Not Detected     Chlamydophila pneumoniae PCR Not Detected     Mycoplasma pneumo by PCR Not Detected     Influenza A PCR Not Detected     Influenza A H3 Not Detected     Influenza A H1 Not Detected     RSV, PCR Not Detected    Blood Culture - Blood, Hand, Left [[id number]] Collected:  02/20/20 1433    Lab Status:  Preliminary result Specimen:  Blood from Hand, Left Updated:  02/21/20 0245     Blood Culture No growth at less than 24 hours    Blood Culture - Blood, Wrist, Right [[id number]] Collected:  02/20/20 1419    Lab Status:  Preliminary result Specimen:  Blood from Wrist, Right Updated:  02/21/20 0245     Blood Culture No growth at less than 24 hours          ECG/EMG Results (most recent)     None               Results for orders placed in visit on 02/17/20   SCANNED - ECHOCARDIOGRAM       Xr Chest Pa & Lateral    Result Date: 2/20/2020  Questionable mild patchy airspace changes within the left lung base which may be related to a mild pneumonia.  Electronically Signed By-Gabriella Ritchie On:2/20/2020 3:52 PM This report was finalized on [id number] by  Gabriella Ritchie  .      Xrays, labs reviewed personally by physician.    Medication Review:   I have reviewed the patient's current medication list      Scheduled Meds    cefdinir 300 mg Oral Q12H   diphenoxylate-atropine 1 tablet Oral BID   doxycycline 100 mg Oral Q12H   DULoxetine 60 mg Oral BID   enoxaparin 40 mg Subcutaneous Daily   gabapentin 300 mg Oral TID   lisinopril 20 mg Oral Daily   pantoprazole 40 mg Oral BID   sodium chloride 10 mL Intravenous Q12H       Meds Infusions    sodium chloride 100 mL/hr Last Rate: 100 mL/hr (02/21/20 0155)       Meds PRN  •  acetaminophen  •  clonazePAM  •  ondansetron **OR** ondansetron  •  oxyCODONE  •  promethazine  •  sodium chloride    Assessment/Plan   Assessment/Plan     Active Hospital Problems    Diagnosis  POA   • **Non-STEMI (non-ST elevated myocardial infarction) (CMS/HCC) [I21.4]  Yes   • Coronary artery disease [I25.10]  Yes   • Depression [F32.9]  Yes   • Gastroesophageal reflux disease [K21.9]  Yes   • Hyperlipidemia [E78.5]  Yes   • Essential hypertension [I10]  Yes   • Irritable bowel syndrome [K58.9]  Yes   • Psoriasis [L40.9]  Yes      Resolved Hospital Problems   No resolved problems to display.     Fever- Unclear source at this time, see below   -Obtain CXR   -Blood cultures, lactate, PCT, resp panel     Non-STEMI, troponin I increased from 0.06-0.11: Diagnosed with NSTEMI on admission due to elevation of troponin at OSH, troponin remains stable here in the hospital, and stress test negative.  --Stress test negative   --Cardiology consulted, appreciate recs      GERD- had episode of nausea/vomiting after eating, reports not consistently taking PPI at home. No hx of scopes.   --Increase PPI to BID   --PRNs on board for nausea      Generalized weakness with nausea vomiting and night sweats  --Patient able to independently perform ADLs      Renal injury, improving, creatinine 1.07 which was improved from 1.11--likely secondary to the azotemia from volume loss due to  vomiting and night: IV fluids; BMP in a.m.    --Potassium 4.3; sodium 139, magnesium 1.8 at prior facility     Hypertension, chronic, controlled: Continue lisinopril     Anxiety, chronic: Continue clonazepam, Cymbalta     IBS with primary diarrhea: Continue Lomotil      Arthritis with chronic pain: Continue oxycodone; continue gabapentin Psoriasis--with prior treatment failure Humira and allergy to methotrexate: Add Aquaphor     VTE Prophylaxis - SCDs.    Code Status -   Code Status and Medical Interventions:   Ordered at: 02/18/20 2108     Code Status:    CPR     Medical Interventions (Level of Support Prior to Arrest):    Full       Discharge Planning    Destination      Coordination has not been started for this encounter.      Durable Medical Equipment      Coordination has not been started for this encounter.      Dialysis/Infusion      Coordination has not been started for this encounter.      Home Medical Care - Selection Complete      Service Provider Request Status Selected Services Address Phone Number Fax Number    [facility] Selected Home Health Services [address] [place] [address] [place] [address] [phone]    [facility] - Request Sent N/A [address] [facility] [place] [id number] [phone] --    [facility] Pending - Request Sent N/A [address] [place] [address] [phone]    [facility] Declined N/A [address] [facility] [place] [id number] [phone]    [facility] Declined  Unable to Meet Patient Needs N/A [address] [address] [phone]      Therapy      Coordination has not been started for this encounter.      Community Resources      Coordination has not been started for this encounter.            Electronically signed by Inge Kline MD, 02/21/20, 11:21  [place].  [facility] Hospitalist Team    \

## 2020-02-21 NOTE — PROGRESS NOTES
"Referring Provider: Hospitalist    Reason for follow-up: Follow-up coronary disease     Patient Care Team:  Gregory Gallo MD as PCP - General  Gregory Gallo MD as PCP - Family Medicine    Subjective .  No chest pain or shortness of breath    Objective  Lying in bed comfortably     Review of Systems   Constitution: Negative for fever and malaise/fatigue.   Cardiovascular: Negative for chest pain, dyspnea on exertion and palpitations.   Respiratory: Negative for cough and shortness of breath.    Skin: Negative for rash.   Gastrointestinal: Negative for abdominal pain, nausea and vomiting.   Neurological: Negative for focal weakness and headaches.   All other systems reviewed and are negative.      Iodine; Naproxen; Methotrexate derivatives; Morphine; Sulfa antibiotics; and Sulfamethoxazole    Scheduled Meds:    cefdinir 300 mg Oral Q12H   diphenoxylate-atropine 1 tablet Oral BID   doxycycline 100 mg Oral Q12H   DULoxetine 60 mg Oral BID   enoxaparin 40 mg Subcutaneous Daily   gabapentin 300 mg Oral TID   lisinopril 20 mg Oral Daily   pantoprazole 40 mg Oral BID   sodium chloride 10 mL Intravenous Q12H     Continuous Infusions:    sodium chloride 100 mL/hr Last Rate: 100 mL/hr (02/21/20 0155)     PRN Meds:.•  acetaminophen  •  clonazePAM  •  ondansetron **OR** ondansetron  •  oxyCODONE  •  promethazine  •  sodium chloride        VITAL SIGNS  Vitals:    02/20/20 2245 02/21/20 0321 02/21/20 0500 02/21/20 0605   BP: 121/56 115/58  109/56   Patient Position: Lying Lying  Lying   Pulse: 89 88  87   Resp: 18 16  12   Temp: 98.2 °F (36.8 °C) 98.8 °F (37.1 °C)  98.8 °F (37.1 °C)   TempSrc: Oral Oral  Oral   SpO2: 94% 97%  95%   Weight:   76.3 kg (168 lb 3.4 oz)    Height:           Flowsheet Rows      First Filed Value   Admission Height  157.5 cm (62\") Documented at 02/18/2020 1900   Admission Weight  73.5 kg (162 lb 0.6 oz) Documented at 02/18/2020 1900           TELEMETRY: Sinus rhythm with " nonspecific ST segment amenity    Physical Exam:  Physical Exam   Constitutional: She appears well-developed and well-nourished.   HENT:   Head: Normocephalic and atraumatic.   Eyes: Conjunctivae are normal. No scleral icterus.   Neck: Normal range of motion. Neck supple. No JVD present. Carotid bruit is not present.   Cardiovascular: Normal rate, regular rhythm, S1 normal, S2 normal, normal heart sounds and intact distal pulses. PMI is not displaced.   Pulmonary/Chest: Effort normal and breath sounds normal. She has no wheezes. She has no rales.   Abdominal: Soft. Bowel sounds are normal.   Neurological: She is alert. She has normal strength.   Skin: Skin is warm and dry. No rash noted.        Results Review:   I reviewed the patient's new clinical results.  Lab Results (last 24 hours)     Procedure Component Value Units Date/Time    Blood Culture - Blood, Hand, Left [[id number]] Collected:  02/20/20 1433    Specimen:  Blood from Hand, Left Updated:  02/21/20 0245     Blood Culture No growth at less than 24 hours    Blood Culture - Blood, Wrist, Right [[id number]] Collected:  02/20/20 1419    Specimen:  Blood from Wrist, Right Updated:  02/21/20 0245     Blood Culture No growth at less than 24 hours    Respiratory Panel, PCR - Swab, Nasopharynx [[id number]]  (Normal) Collected:  02/20/20 1438    Specimen:  Swab from Nasopharynx Updated:  02/20/20 1655     ADENOVIRUS, PCR Not Detected     Coronavirus 229E Not Detected     Coronavirus HKU1 Not Detected     Coronavirus NL63 Not Detected     Coronavirus OC43 Not Detected     Human Metapneumovirus Not Detected     Human Rhinovirus/Enterovirus Not Detected     Influenza B PCR Not Detected     Parainfluenza Virus 1 Not Detected     Parainfluenza Virus 2 Not Detected     Parainfluenza Virus 3 Not Detected     Parainfluenza Virus 4 Not Detected     Bordetella pertussis pcr Not Detected     Influenza A H1 2009 PCR Not Detected     Chlamydophila pneumoniae PCR Not Detected      "Mycoplasma pneumo by PCR Not Detected     Influenza A PCR Not Detected     Influenza A H3 Not Detected     Influenza A H1 Not Detected     RSV, PCR Not Detected    Procalcitonin [[id number]]  (Normal) Collected:  02/20/20 1419    Specimen:  Blood Updated:  02/20/20 1519     Procalcitonin 0.12 ng/mL     Narrative:       As a Marker for Sepsis (Non-Neonates):   1. <0.5 ng/mL represents a low risk of severe sepsis and/or septic shock.  1. >2 ng/mL represents a high risk of severe sepsis and/or septic shock.    As a Marker for Lower Respiratory Tract Infections that require antibiotic therapy:  PCT on Admission     Antibiotic Therapy             6-12 Hrs later  > 0.5                Strongly Recommended            >0.25 - <0.5         Recommended  0.1 - 0.25           Discouraged                   Remeasure/reassess PCT  <0.1                 Strongly Discouraged          Remeasure/reassess PCT      As 28 day mortality risk marker: \"Change in Procalcitonin Result\" (> 80 % or <=80 %) if Day 0 (or Day 1) and Day 4 values are available. Refer to http://www.[facility]-pct-calculator.com/   Change in PCT <=80 %   A decrease of PCT levels below or equal to 80 % defines a positive change in PCT test result representing a higher risk for 28-day all-cause mortality of patients diagnosed with severe sepsis or septic shock.  Change in PCT > 80 %   A decrease of PCT levels of more than 80 % defines a negative change in PCT result representing a lower risk for 28-day all-cause mortality of patients diagnosed with severe sepsis or septic shock.                Results may be falsely decreased if patient taking Biotin.     CBC & Differential [[id number]] Collected:  02/20/20 1419    Specimen:  Blood Updated:  02/20/20 1517    Narrative:       The following orders were created for panel order CBC & Differential.  Procedure                               Abnormality         Status                     ---------                               " -----------         ------                     CBC Auto Differential[[id number]]        Abnormal            Final result                 Please view results for these tests on the individual orders.    CBC Auto Differential [[id number]]  (Abnormal) Collected:  02/20/20 1419    Specimen:  Blood Updated:  02/20/20 1517     WBC 9.80 10*3/mm3      RBC 4.10 10*6/mm3      Hemoglobin 12.7 g/dL      Hematocrit 38.2 %      MCV 93.1 fL      MCH 30.9 pg      MCHC 33.2 g/dL      RDW 13.0 %      RDW-SD 42.9 fl      MPV 7.7 fL      Platelets 234 10*3/mm3      Neutrophil % 77.4 %      Lymphocyte % 15.5 %      Monocyte % 4.8 %      Eosinophil % 1.9 %      Basophil % 0.4 %      Neutrophils, Absolute 7.60 10*3/mm3      Lymphocytes, Absolute 1.50 10*3/mm3      Monocytes, Absolute 0.50 10*3/mm3      Eosinophils, Absolute 0.20 10*3/mm3      Basophils, Absolute 0.00 10*3/mm3      nRBC 0.0 /100 WBC     Lactic Acid, Plasma [[id number]]  (Normal) Collected:  02/20/20 1419    Specimen:  Blood Updated:  02/20/20 1507     Lactate 1.2 mmol/L           Imaging Results (Last 24 Hours)     Procedure Component Value Units Date/Time    XR Chest PA & Lateral [[id number]] Collected:  02/20/20 1552     Updated:  02/20/20 1556    Narrative:       Examination: XR CHEST PA AND LATERAL-     Date of Exam: 2/20/2020 3:40 PM     Indication: Fever.  Nonsmoker, history of hypertension and psoriasis      Comparison: None available.     Technique: 2 radiographic views of the chest were obtained.     Findings:  Questionable mild patchy airspace changes are present within the left  lung base. No pleural effusions. No pneumothorax. The heart size is  normal. The pulmonary vasculature appears within normal limits. No acute  osseous abnormality identified.       Impression:       Questionable mild patchy airspace changes within the left lung base  which may be related to a mild pneumonia.     Electronically Signed ByAaron Ritchie On:2/20/2020 3:52 PM  This report was  finalized on [id number] by  Gabriella Ritchie .          EKG      I personally viewed and interpreted the patient's EKG/Telemetry data:    ECHOCARDIOGRAM:    STRESS MYOVIEW:    CARDIAC CATHETERIZATION:    OTHER:         Assessment/Plan     Principal Problem:    Non-STEMI (non-ST elevated myocardial infarction) (CMS/[facility])  Active Problems:    Coronary artery disease    Depression    Essential hypertension    Gastroesophageal reflux disease    Hyperlipidemia    Irritable bowel syndrome    Psoriasis       Patient is ruled out for MI by EKG enzymes  Patient had a stress Myoview did not show any ischemia  Blood pressure and heart rate stable  Continue current medicines and follow her as an outpatient    I discussed the patients findings and my recommendations with patient and nurse    Segundo Pinzon MD  02/21/20  10:57 AM

## 2020-02-22 ENCOUNTER — READMISSION MANAGEMENT (OUTPATIENT)
Dept: CALL CENTER | Facility: HOSPITAL | Age: 70
End: 2020-02-22

## 2020-02-22 NOTE — OUTREACH NOTE
Prep Survey      Responses   Facility patient discharged from?  [facility]   Is patient eligible?  Yes   Discharge diagnosis  Non-STEMI   Does the patient have one of the following disease processes/diagnoses(primary or secondary)?  Acute MI (STEMI,NSTEMI)   Does the patient have Home health ordered?  Yes   What is the Home health agency?   [facility]    Is there a DME ordered?  No   Prep survey completed?  Yes          Kaela Hendrix RN

## 2020-02-24 ENCOUNTER — READMISSION MANAGEMENT (OUTPATIENT)
Dept: CALL CENTER | Facility: HOSPITAL | Age: 70
End: 2020-02-24

## 2020-02-24 NOTE — OUTREACH NOTE
Stroke Week 1 Survey      Responses   Facility patient discharged from?  [facility]   Does the patient have one of the following disease processes/diagnoses(primary or secondary)?  Acute MI (STEMI,NSTEMI)   Revoke  Decline to participate [No answer/Left voicemail]          Angela Badillo LPN

## 2020-02-25 LAB
BACTERIA SPEC AEROBE CULT: NORMAL
BACTERIA SPEC AEROBE CULT: NORMAL
BH CV NUCLEAR PRIOR STUDY: 3
BH CV STRESS BP STAGE 1: NORMAL
BH CV STRESS BP STAGE 2: NORMAL
BH CV STRESS COMMENTS STAGE 1: NORMAL
BH CV STRESS COMMENTS STAGE 2: NORMAL
BH CV STRESS DOSE REGADENOSON STAGE 1: 0.4
BH CV STRESS DURATION MIN STAGE 1: 0
BH CV STRESS DURATION MIN STAGE 2: 4
BH CV STRESS DURATION SEC STAGE 1: 10
BH CV STRESS DURATION SEC STAGE 2: 0
BH CV STRESS HR STAGE 1: 78
BH CV STRESS HR STAGE 2: 88
BH CV STRESS PROTOCOL 1: NORMAL
BH CV STRESS RECOVERY BP: NORMAL MMHG
BH CV STRESS RECOVERY HR: 85 BPM
BH CV STRESS STAGE 1: 1
BH CV STRESS STAGE 2: 2
LV EF NUC BP: 75 %
MAXIMAL PREDICTED HEART RATE: 150 BPM
PERCENT MAX PREDICTED HR: 58.67 %
STRESS BASELINE BP: NORMAL MMHG
STRESS BASELINE HR: 78 BPM
STRESS PERCENT HR: 69 %
STRESS POST PEAK BP: NORMAL MMHG
STRESS POST PEAK HR: 88 BPM
STRESS TARGET HR: 128 BPM

## 2020-03-03 ENCOUNTER — APPOINTMENT (OUTPATIENT)
Dept: CT IMAGING | Facility: HOSPITAL | Age: 70
End: 2020-03-03

## 2020-03-03 ENCOUNTER — APPOINTMENT (OUTPATIENT)
Dept: GENERAL RADIOLOGY | Facility: HOSPITAL | Age: 70
End: 2020-03-03

## 2020-03-03 ENCOUNTER — HOSPITAL ENCOUNTER (EMERGENCY)
Facility: HOSPITAL | Age: 70
Discharge: HOME | End: 2020-03-03
Admitting: EMERGENCY MEDICINE

## 2020-03-03 VITALS
HEART RATE: 73 BPM | RESPIRATION RATE: 18 BRPM | OXYGEN SATURATION: 98 % | DIASTOLIC BLOOD PRESSURE: 78 MMHG | TEMPERATURE: 98.1 F | BODY MASS INDEX: 29.07 KG/M2 | HEIGHT: 61 IN | SYSTOLIC BLOOD PRESSURE: 122 MMHG | WEIGHT: 154 LBS
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
## 2020-03-03 DIAGNOSIS — S00.83XA CONTUSION OF FACE, INITIAL ENCOUNTER: ICD-10-CM

## 2020-03-03 DIAGNOSIS — S70.01XA CONTUSION OF RIGHT HIP, INITIAL ENCOUNTER: ICD-10-CM

## 2020-03-03 PROCEDURE — 72125 CT NECK SPINE W/O DYE: CPT

## 2020-03-03 PROCEDURE — 73502 X-RAY EXAM HIP UNI 2-3 VIEWS: CPT

## 2020-03-03 PROCEDURE — 70486 CT MAXILLOFACIAL W/O DYE: CPT

## 2020-03-03 PROCEDURE — 99283 EMERGENCY DEPT VISIT LOW MDM: CPT

## 2020-03-03 NOTE — ED NOTES
Patient tripped and fell hitting the back of her head on closet door. Reports pain in right side of neck and right hip     Paula Yang RN  03/03/20 3636

## 2020-03-03 NOTE — ED PROVIDER NOTES
Subjective   Chief complaint: fall      Context: Patient is a 69-year-old female who comes in after a fall around 530 this morning when she tripped.  She denies any loss of consciousness unilateral focal deficits weakness confusion ataxia or lethargy.  Is been ambulatory.  She is complaining of posterior head pain and right-sided jaw pain and right hip pain.  Denies any saddle anesthesia bowel or bladder incontinence or retention.  States she went to her pain management appointment this morning and then came to the hospital to be evaluated she denies any bleeding or drainage from the ears or nose.  She is edentulous.  Denies any visual changes..    Duration: 530 this morning    Timing: Waxes and wanes    Severity: Moderate    Associated symptoms: Worse with range of motion          PCP:  tiffanie            Review of Systems   HENT:        Jaw pain   Eyes: Negative.    Respiratory: Negative.    Cardiovascular: Negative.    Gastrointestinal: Negative.    Genitourinary: Negative.    Musculoskeletal: Positive for arthralgias.   Skin:        Chronic psoriasis   Allergic/Immunologic: Negative for immunocompromised state.   Neurological: Negative.    Hematological: Does not bruise/bleed easily.       Past Medical History:   Diagnosis Date   • CAD (coronary artery disease)    • GERD (gastroesophageal reflux disease)    • Hypertension    • Mood disorder (CMS/[facility])        Allergies   Allergen Reactions   • Iodine Anaphylaxis and Swelling   • Naproxen Swelling   • Methotrexate Derivatives Other (See Comments)     other   • Morphine Other (See Comments)     Neurologic   • Sulfa Antibiotics Unknown - High Severity   • Sulfamethoxazole Hives and Swelling       History reviewed. No pertinent surgical history.    History reviewed. No pertinent family history.    Social History     Socioeconomic History   • Marital status: Single     Spouse name: Not on file   • Number of children: Not on file   • Years of education: Not on file   •  Highest education level: Not on file   Tobacco Use   • Smoking status: Never Smoker   • Smokeless tobacco: Never Used   Substance and Sexual Activity   • Alcohol use: Not Currently   • Drug use: Never           Objective   Physical Exam     Vital signs and traige nurse note reviewed.  Constitutional:  Awake, alert; well developed and well nourished.  No acute distress, the patient is examined in hospital gown.  HEENT:  Normocephalic, atraumatic; pupils are PERRL with intact EOM; oropharynx is pink and moist without edema or erythema no caicedo sign or raccoon eyes.  Patient has some pain noted to the right mandible without malalignment or clicking, patient is edentulous.  No bleeding or drainage from the ears or nose.  Neck: Supple, full range of motion without pain;    Cardiovascular: Regular rate and rhythm, normal S1-S2. .  Pulmonary: Respiratory effort regular, nonlabored; breath sounds CTA without wheezing or rhonchi.  Abdomen: Soft, nontender, nondistended with normoactive bowel sounds; no rebound or guarding.    Musculoskeletal: Independent range of motion of all extremities, some pain to palpation over the right femoral neck, there is no shortening or rotation.  Stable pelvic rock.  Back:  Spine is midline and without midline tenderness on palpation of cervical, thoracic, and lumbar spine; paraspinal musculature is nontender and without soft tissue swelling, overlying ecchymosis or erythema. ROM is without pain,  Distal muscle strength is 5/5.  Neuro: Alert oriented x3, speech is clear and appropriate.   negative Babinski BLE, negative straight leg raise BLE, strong and equal dorsiflexion of bilateral great toes L4, L5, S1 motor sensory intact.        Procedures           ED Course      Labs Reviewed - No data to display  Medications - No data to display  Ct Cervical Spine Without Contrast    Result Date: 3/3/2020   1.  No acute cervical spine fracture. 2.  Degenerative disc disease at the C5/6 and C6/7  levels resulting in moderate canal stenosis and severe neural foraminal narrowing.  Electronically Signed By-Jean Claude Mosley On:3/3/2020 1:20 PM This report was finalized on [id number] by  Jean Claude Mosley, .    Ct Facial Bones Without Contrast    Result Date: 3/3/2020   No acute facial fracture.  Electronically Signed By-Dr. Verónica Kwong MD On:3/3/2020 1:28 PM This report was finalized on [id number] by Dr. Verónica Kwong MD.    Xr Hip With Or Without Pelvis 2 - 3 View Right    Result Date: 3/3/2020  No acute right hip or acute pelvic findings.  Electronically Signed By-Dr. Verónica Kwong MD On:3/3/2020 12:11 PM This report was finalized on [id number] by Dr. Verónica Kwong MD.                                         MDM  Number of Diagnoses or Management Options  Contusion of face, initial encounter:   Contusion of right hip, initial encounter:   Fall, initial encounter:   Diagnosis management comments: Medical decision  Comorbidities:  has a past medical history of CAD (coronary artery disease), GERD (gastroesophageal reflux disease), Hypertension, and Mood disorder (CMS/[facility]).  Differentials: Fracture contusion not all inclusive of differentials considered  Radiology interpretation:  X-rays reviewed by me and interpreted by radiologist,   Ct Cervical Spine Without Contrast    Result Date: 3/3/2020   1.  No acute cervical spine fracture. 2.  Degenerative disc disease at the C5/6 and C6/7 levels resulting in moderate canal stenosis and severe neural foraminal narrowing.  Electronically Signed By-Jean Claude Mosley On:3/3/2020 1:20 PM This report was finalized on [id number] by  Jean Claude Mosley, .    Ct Facial Bones Without Contrast    Result Date: 3/3/2020   No acute facial fracture.  Electronically Signed By-Dr. Verónica Kwong MD On:3/3/2020 1:28 PM This report was finalized on [id number] by Dr. Verónica Kwong MD.    Xr Hip With Or Without Pelvis 2 - 3 View Right    Result Date: 3/3/2020  No acute right hip  or acute pelvic findings.  Electronically Signed By-Dr. Verónica Kwong MD On:3/3/2020 12:11 PM This report was finalized on [id number] by Dr. Verónica Kwong MD.    Patient is ambulatory.    i discussed findings with patient who voices understanding of discharge instructions, signs and symptoms requiring return to ED; discharged improved and in stable condition with follow up for re-evaluation.  She is somewhat agitated on discharge that she had to be moved from [facility] to staging back to [facility] for discharge-she was informed of hospital's policy regarding this    Patient Progress  Patient progress: stable      Final diagnoses:   Fall, initial encounter   Contusion of right hip, initial encounter   Contusion of face, initial encounter            Martha Garcias, APRN  03/03/20 1505

## 2020-03-10 ENCOUNTER — OFFICE VISIT (OUTPATIENT)
Dept: CARDIOLOGY | Facility: CLINIC | Age: 70
End: 2020-03-10

## 2020-03-10 VITALS
DIASTOLIC BLOOD PRESSURE: 85 MMHG | WEIGHT: 152 LBS | SYSTOLIC BLOOD PRESSURE: 141 MMHG | BODY MASS INDEX: 28.7 KG/M2 | OXYGEN SATURATION: 98 % | HEART RATE: 78 BPM | HEIGHT: 61 IN

## 2020-03-10 DIAGNOSIS — E78.00 PURE HYPERCHOLESTEROLEMIA: ICD-10-CM

## 2020-03-10 DIAGNOSIS — I10 ESSENTIAL HYPERTENSION: ICD-10-CM

## 2020-03-10 DIAGNOSIS — I25.118 CORONARY ARTERY DISEASE OF NATIVE ARTERY OF NATIVE HEART WITH STABLE ANGINA PECTORIS (HCC): Primary | ICD-10-CM

## 2020-03-10 PROCEDURE — 99213 OFFICE O/P EST LOW 20 MIN: CPT | Performed by: INTERNAL MEDICINE

## 2020-03-10 NOTE — PROGRESS NOTES
"    Subjective:     Encounter Date:03/10/2020      Patient ID: Katharina Brennan is a 69 y.o. female.    Chief Complaint:  History of Present Illness 69-year-old white female with history of coronary artery disease status post MI in the past history of hypertension hyperlipidemia presents to my office for follow-up.  Patient is currently stable with absence of chest pain but has some shortness of breath with exertion.  No complains of any PND orthopnea.  No palpitations dizziness syncope or swelling of the feet.  Patient has been taking all his medicines regularly.  Patient does not smoke.  She is trying to exercise regularly.  She was in the hospital recently and had a stress test which did not show any ischemia.    The following portions of the patient's history were reviewed and updated as appropriate: allergies, current medications, past family history, past medical history, past social history, past surgical history and problem list.  Past Medical History:   Diagnosis Date   • CAD (coronary artery disease)    • GERD (gastroesophageal reflux disease)    • Hypertension    • Mood disorder (CMS/HCC)      History reviewed. No pertinent surgical history.  /85 (BP Location: Left arm, Patient Position: Sitting)   Pulse 78   Ht 154.9 cm (61\")   Wt 68.9 kg (152 lb)   SpO2 98%   BMI 28.72 kg/m²   History reviewed. No pertinent family history.    Current Outpatient Medications:   •  clonazePAM (KlonoPIN) 1 MG tablet, Take 1 mg by mouth 2 (Two) Times a Day As Needed for Seizures., Disp: , Rfl:   •  diphenoxylate-atropine (LOMOTIL) 2.5-0.025 MG per tablet, Take 1 tablet by mouth 2 (Two) Times a Day., Disp: , Rfl:   •  DULoxetine (CYMBALTA) 60 MG capsule, Take 60 mg by mouth 2 (Two) Times a Day., Disp: , Rfl:   •  gabapentin (NEURONTIN) 300 MG capsule, Take 300 mg by mouth 3 (Three) Times a Day., Disp: , Rfl:   •  lisinopril (PRINIVIL,ZESTRIL) 20 MG tablet, Take 20 mg by mouth Daily., Disp: , Rfl:   •  oxyCODONE " (ROXICODONE) 10 MG tablet, Take 10 mg by mouth Every 6 (Six) Hours As Needed for Moderate Pain ., Disp: , Rfl:   •  pantoprazole (PROTONIX) 40 MG EC tablet, Take 1 tablet by mouth 2 (Two) Times a Day., Disp: 60 tablet, Rfl: 2  Allergies   Allergen Reactions   • Iodine Anaphylaxis and Swelling   • Naproxen Swelling   • Methotrexate Derivatives Other (See Comments)     other   • Morphine Other (See Comments)     Neurologic   • Sulfa Antibiotics Unknown - High Severity   • Sulfamethoxazole Hives and Swelling     Social History     Socioeconomic History   • Marital status: Single     Spouse name: Not on file   • Number of children: Not on file   • Years of education: Not on file   • Highest education level: Not on file   Tobacco Use   • Smoking status: Never Smoker   • Smokeless tobacco: Never Used   Substance and Sexual Activity   • Alcohol use: Not Currently   • Drug use: Never     Review of Systems   Constitution: Positive for malaise/fatigue. Negative for chills and fever.   Cardiovascular: Positive for dyspnea on exertion. Negative for chest pain and palpitations.   Respiratory: Negative for cough and shortness of breath.    Skin: Positive for rash.   Gastrointestinal: Negative for abdominal pain, nausea and vomiting.   Neurological: Negative for dizziness, focal weakness, headaches and light-headedness.   All other systems reviewed and are negative.             Objective:     Physical Exam   Constitutional: She appears well-developed and well-nourished.   HENT:   Head: Normocephalic and atraumatic.   Eyes: Conjunctivae are normal. No scleral icterus.   Neck: Normal range of motion. Neck supple. No JVD present. Carotid bruit is not present.   Cardiovascular: Normal rate, regular rhythm, S1 normal, S2 normal, normal heart sounds and intact distal pulses. PMI is not displaced.   Pulmonary/Chest: Effort normal and breath sounds normal. She has no wheezes. She has no rales.   Abdominal: Soft. Bowel sounds are normal.    Neurological: She is alert. She has normal strength.   Skin: Skin is warm and dry. No rash noted.     Procedures    Lab Review:       Assessment:          Diagnosis Plan   1. Coronary artery disease of native artery of native heart with stable angina pectoris (CMS/HCC)     2. Essential hypertension     3. Pure hypercholesterolemia            Plan:       Patient has history of coronary status post MI and stent placed in the past and is currently stable on medications  Patient had recently a stress Myoview did not show any ischemia  Patient blood pressure and heart rate stable  Patient's lipid levels are followed by the primary care doctor.  Continue current medicines and follow in 6 months

## 2020-06-22 ENCOUNTER — OFFICE (AMBULATORY)
Dept: URBAN - METROPOLITAN AREA CLINIC 64 | Facility: CLINIC | Age: 70
End: 2020-06-22
Payer: MEDICARE

## 2020-06-22 VITALS
DIASTOLIC BLOOD PRESSURE: 83 MMHG | WEIGHT: 162 LBS | HEART RATE: 74 BPM | SYSTOLIC BLOOD PRESSURE: 137 MMHG | HEIGHT: 62 IN

## 2020-06-22 DIAGNOSIS — K21.9 GASTRO-ESOPHAGEAL REFLUX DISEASE WITHOUT ESOPHAGITIS: ICD-10-CM

## 2020-06-22 DIAGNOSIS — Z86.010 PERSONAL HISTORY OF COLONIC POLYPS: ICD-10-CM

## 2020-06-22 DIAGNOSIS — K22.70 BARRETT'S ESOPHAGUS WITHOUT DYSPLASIA: ICD-10-CM

## 2020-06-22 DIAGNOSIS — R13.19 OTHER DYSPHAGIA: ICD-10-CM

## 2020-06-22 PROCEDURE — 99213 OFFICE O/P EST LOW 20 MIN: CPT | Performed by: INTERNAL MEDICINE

## 2020-06-22 RX ORDER — OMEPRAZOLE 40 MG/1
40 CAPSULE, DELAYED RELEASE ORAL
Qty: 90 | Refills: 3 | Status: COMPLETED
Start: 2020-06-22 | End: 2020-08-10

## 2020-06-25 ENCOUNTER — OFFICE (AMBULATORY)
Dept: URBAN - METROPOLITAN AREA PATHOLOGY 4 | Facility: PATHOLOGY | Age: 70
End: 2020-06-25
Payer: COMMERCIAL

## 2020-06-25 ENCOUNTER — ON CAMPUS - OUTPATIENT (AMBULATORY)
Dept: URBAN - METROPOLITAN AREA HOSPITAL 2 | Facility: HOSPITAL | Age: 70
End: 2020-06-25
Payer: MEDICARE

## 2020-06-25 ENCOUNTER — OFFICE (AMBULATORY)
Dept: URBAN - METROPOLITAN AREA PATHOLOGY 4 | Facility: PATHOLOGY | Age: 70
End: 2020-06-25
Payer: MEDICARE

## 2020-06-25 VITALS
DIASTOLIC BLOOD PRESSURE: 52 MMHG | DIASTOLIC BLOOD PRESSURE: 79 MMHG | WEIGHT: 160 LBS | HEIGHT: 62 IN | SYSTOLIC BLOOD PRESSURE: 122 MMHG | RESPIRATION RATE: 16 BRPM | HEART RATE: 70 BPM | SYSTOLIC BLOOD PRESSURE: 133 MMHG | RESPIRATION RATE: 20 BRPM | TEMPERATURE: 96.6 F | DIASTOLIC BLOOD PRESSURE: 84 MMHG | OXYGEN SATURATION: 100 % | DIASTOLIC BLOOD PRESSURE: 61 MMHG | HEART RATE: 67 BPM | SYSTOLIC BLOOD PRESSURE: 166 MMHG | HEART RATE: 77 BPM | SYSTOLIC BLOOD PRESSURE: 107 MMHG | SYSTOLIC BLOOD PRESSURE: 141 MMHG | HEART RATE: 74 BPM | DIASTOLIC BLOOD PRESSURE: 77 MMHG | DIASTOLIC BLOOD PRESSURE: 68 MMHG | SYSTOLIC BLOOD PRESSURE: 138 MMHG | HEART RATE: 69 BPM | RESPIRATION RATE: 18 BRPM | HEART RATE: 68 BPM | SYSTOLIC BLOOD PRESSURE: 149 MMHG | HEART RATE: 71 BPM | OXYGEN SATURATION: 99 % | DIASTOLIC BLOOD PRESSURE: 66 MMHG | OXYGEN SATURATION: 95 % | SYSTOLIC BLOOD PRESSURE: 100 MMHG | DIASTOLIC BLOOD PRESSURE: 90 MMHG

## 2020-06-25 DIAGNOSIS — R13.19 OTHER DYSPHAGIA: ICD-10-CM

## 2020-06-25 DIAGNOSIS — K22.70 BARRETT'S ESOPHAGUS WITHOUT DYSPLASIA: ICD-10-CM

## 2020-06-25 DIAGNOSIS — K21.9 GASTRO-ESOPHAGEAL REFLUX DISEASE WITHOUT ESOPHAGITIS: ICD-10-CM

## 2020-06-25 DIAGNOSIS — Z86.010 PERSONAL HISTORY OF COLONIC POLYPS: ICD-10-CM

## 2020-06-25 DIAGNOSIS — K44.9 DIAPHRAGMATIC HERNIA WITHOUT OBSTRUCTION OR GANGRENE: ICD-10-CM

## 2020-06-25 DIAGNOSIS — K52.9 NONINFECTIVE GASTROENTERITIS AND COLITIS, UNSPECIFIED: ICD-10-CM

## 2020-06-25 DIAGNOSIS — K57.30 DIVERTICULOSIS OF LARGE INTESTINE WITHOUT PERFORATION OR ABS: ICD-10-CM

## 2020-06-25 DIAGNOSIS — K22.2 ESOPHAGEAL OBSTRUCTION: ICD-10-CM

## 2020-06-25 DIAGNOSIS — K21.0 GASTRO-ESOPHAGEAL REFLUX DISEASE WITH ESOPHAGITIS: ICD-10-CM

## 2020-06-25 DIAGNOSIS — K64.8 OTHER HEMORRHOIDS: ICD-10-CM

## 2020-06-25 LAB
GI HISTOLOGY: A. SELECT: (no result)
GI HISTOLOGY: B. SELECT: (no result)
GI HISTOLOGY: PDF REPORT: (no result)

## 2020-06-25 PROCEDURE — 45380 COLONOSCOPY AND BIOPSY: CPT | Performed by: INTERNAL MEDICINE

## 2020-06-25 PROCEDURE — 43239 EGD BIOPSY SINGLE/MULTIPLE: CPT | Performed by: INTERNAL MEDICINE

## 2020-06-25 PROCEDURE — 88312 SPECIAL STAINS GROUP 1: CPT | Mod: 26 | Performed by: INTERNAL MEDICINE

## 2020-06-25 PROCEDURE — 43450 DILATE ESOPHAGUS 1/MULT PASS: CPT | Performed by: INTERNAL MEDICINE

## 2020-06-25 PROCEDURE — 88305 TISSUE EXAM BY PATHOLOGIST: CPT | Mod: 26 | Performed by: INTERNAL MEDICINE

## 2020-06-25 RX ORDER — SUCRALFATE 1 G/1
TABLET ORAL
Qty: 360 | Refills: 5 | Status: COMPLETED
Start: 2020-06-25 | End: 2024-01-12

## 2020-07-22 ENCOUNTER — OFFICE (AMBULATORY)
Dept: URBAN - METROPOLITAN AREA CLINIC 64 | Facility: CLINIC | Age: 70
End: 2020-07-22
Payer: MEDICARE

## 2020-07-22 VITALS
HEIGHT: 62 IN | HEART RATE: 78 BPM | WEIGHT: 160 LBS | DIASTOLIC BLOOD PRESSURE: 72 MMHG | SYSTOLIC BLOOD PRESSURE: 134 MMHG

## 2020-07-22 DIAGNOSIS — R10.30 LOWER ABDOMINAL PAIN, UNSPECIFIED: ICD-10-CM

## 2020-07-22 DIAGNOSIS — R13.10 DYSPHAGIA, UNSPECIFIED: ICD-10-CM

## 2020-07-22 DIAGNOSIS — K59.00 CONSTIPATION, UNSPECIFIED: ICD-10-CM

## 2020-07-22 DIAGNOSIS — K64.8 OTHER HEMORRHOIDS: ICD-10-CM

## 2020-07-22 DIAGNOSIS — K21.9 GASTRO-ESOPHAGEAL REFLUX DISEASE WITHOUT ESOPHAGITIS: ICD-10-CM

## 2020-07-22 PROCEDURE — 99213 OFFICE O/P EST LOW 20 MIN: CPT | Performed by: NURSE PRACTITIONER

## 2020-07-22 RX ORDER — LACTULOSE 10 G/15ML
600 SOLUTION ORAL
Qty: 1800 | Refills: 11 | Status: COMPLETED
Start: 2020-07-22 | End: 2024-01-12

## 2020-07-22 RX ORDER — OMEPRAZOLE 40 MG/1
40 CAPSULE, DELAYED RELEASE ORAL
Qty: 90 | Refills: 3 | Status: COMPLETED
Start: 2020-07-22 | End: 2024-01-12

## 2020-07-23 ENCOUNTER — TRANSCRIBE ORDERS (OUTPATIENT)
Dept: ADMINISTRATIVE | Facility: HOSPITAL | Age: 70
End: 2020-07-23

## 2020-07-23 DIAGNOSIS — R13.19 OTHER DYSPHAGIA: Primary | ICD-10-CM

## 2020-08-10 ENCOUNTER — OFFICE (AMBULATORY)
Dept: URBAN - METROPOLITAN AREA CLINIC 64 | Facility: CLINIC | Age: 70
End: 2020-08-10
Payer: MEDICARE

## 2020-08-10 VITALS
DIASTOLIC BLOOD PRESSURE: 83 MMHG | SYSTOLIC BLOOD PRESSURE: 132 MMHG | WEIGHT: 161 LBS | HEIGHT: 62 IN | HEART RATE: 69 BPM

## 2020-08-10 DIAGNOSIS — K21.9 GASTRO-ESOPHAGEAL REFLUX DISEASE WITHOUT ESOPHAGITIS: ICD-10-CM

## 2020-08-10 DIAGNOSIS — R49.0 DYSPHONIA: ICD-10-CM

## 2020-08-10 DIAGNOSIS — R13.10 DYSPHAGIA, UNSPECIFIED: ICD-10-CM

## 2020-08-10 DIAGNOSIS — K59.00 CONSTIPATION, UNSPECIFIED: ICD-10-CM

## 2020-08-10 DIAGNOSIS — Z86.010 PERSONAL HISTORY OF COLONIC POLYPS: ICD-10-CM

## 2020-08-10 DIAGNOSIS — K64.8 OTHER HEMORRHOIDS: ICD-10-CM

## 2020-08-10 DIAGNOSIS — K52.9 NONINFECTIVE GASTROENTERITIS AND COLITIS, UNSPECIFIED: ICD-10-CM

## 2020-08-10 DIAGNOSIS — K44.9 DIAPHRAGMATIC HERNIA WITHOUT OBSTRUCTION OR GANGRENE: ICD-10-CM

## 2020-08-10 PROCEDURE — 99213 OFFICE O/P EST LOW 20 MIN: CPT | Performed by: INTERNAL MEDICINE

## 2020-08-10 RX ORDER — OMEPRAZOLE 40 MG/1
40 CAPSULE, DELAYED RELEASE ORAL
Qty: 90 | Refills: 3 | Status: COMPLETED
Start: 2020-07-22 | End: 2024-01-12

## 2020-08-10 RX ORDER — SUCRALFATE 1 G/1
TABLET ORAL
Qty: 360 | Refills: 5 | Status: COMPLETED
Start: 2020-06-25 | End: 2024-01-12

## 2020-08-10 RX ORDER — LACTULOSE 10 G/15ML
600 SOLUTION ORAL
Qty: 1800 | Refills: 11 | Status: COMPLETED
Start: 2020-07-22 | End: 2024-01-12

## 2020-08-14 DIAGNOSIS — R13.10 DYSPHAGIA, UNSPECIFIED TYPE: Primary | ICD-10-CM

## 2020-08-17 ENCOUNTER — HOSPITAL ENCOUNTER (OUTPATIENT)
Dept: GENERAL RADIOLOGY | Facility: HOSPITAL | Age: 70
End: 2020-08-17

## 2020-08-18 ENCOUNTER — APPOINTMENT (OUTPATIENT)
Dept: GENERAL RADIOLOGY | Facility: HOSPITAL | Age: 70
End: 2020-08-18

## 2020-09-04 RX ORDER — LACTULOSE 10 G/15ML
20 SOLUTION ORAL 2 TIMES DAILY PRN
COMMUNITY

## 2020-09-04 RX ORDER — OMEPRAZOLE 40 MG/1
40 CAPSULE, DELAYED RELEASE ORAL DAILY
COMMUNITY

## 2020-09-04 RX ORDER — MAGNESIUM OXIDE 400 MG/1
400 TABLET ORAL 2 TIMES DAILY
COMMUNITY

## 2020-09-04 RX ORDER — GABAPENTIN 600 MG/1
600 TABLET ORAL AS NEEDED
COMMUNITY

## 2020-09-04 RX ORDER — GABAPENTIN 300 MG/1
300 CAPSULE ORAL 3 TIMES DAILY
COMMUNITY

## 2020-09-04 RX ORDER — DIAZEPAM 5 MG/1
5 TABLET ORAL 3 TIMES DAILY
COMMUNITY

## 2020-09-04 RX ORDER — DONEPEZIL HYDROCHLORIDE 10 MG/1
10 TABLET, FILM COATED ORAL NIGHTLY
COMMUNITY

## 2020-09-04 NOTE — H&P
" LOS: 0 days   Patient Care Team:  Gregory Gallo MD as PCP - General  Gregory Gallo MD as PCP - Family Medicine      Subjective     Interval History:     Subjective: Outpt EGD/FlexSig. Scope for hemorrhoidal banding and dilation of esophagus  No GI complaints  No chest pains or SOA  No fever or chills  No pain complaints.  No contraindications for MAC anesthesia    ROS:   No chest pain, shortness of breath, or cough. Agreeable to scope and anesthesia  No change since scanned H&P       Medication Review:   No current facility-administered medications for this encounter.       Objective     Vital Signs  Vitals:    09/04/20 1044   Weight: 72.6 kg (160 lb)   Height: 157.5 cm (62\")       Physical Exam:    General Appearance:    Awake and alert, in no acute distress   Head:    Normocephalic, without obvious abnormality   Eyes:          Conjunctivae normal, anicteric sclera   Throat:   No oral lesions, no thrush, oral mucosa moist   Neck:   No adenopathy, supple, no JVD   Lungs:     respirations regular, even and unlabored   Abdomen:     Soft, non-tender, no rebound or guarding, non-distended, no hepatosplenomegaly   Rectal:     Deferred   Extremities:   No edema, no cyanosis   Skin:   No bruising or rash, no jaundice        Results Review:    Lab Results (last 24 hours)     ** No results found for the last 24 hours. **          Imaging Results (Last 24 Hours)     ** No results found for the last 24 hours. **            Assessment/Plan   Proceed with scope and MAC anesthesia    No change from office records    Marcelina MD  09/10/20  09:51  "

## 2020-09-08 ENCOUNTER — LAB (OUTPATIENT)
Dept: LAB | Facility: HOSPITAL | Age: 70
End: 2020-09-08

## 2020-09-08 DIAGNOSIS — R13.10 DYSPHAGIA, UNSPECIFIED TYPE: ICD-10-CM

## 2020-09-08 PROCEDURE — C9803 HOPD COVID-19 SPEC COLLECT: HCPCS

## 2020-09-08 PROCEDURE — U0004 COV-19 TEST NON-CDC HGH THRU: HCPCS

## 2020-09-09 ENCOUNTER — ANESTHESIA EVENT (OUTPATIENT)
Dept: GASTROENTEROLOGY | Facility: HOSPITAL | Age: 70
End: 2020-09-09

## 2020-09-09 LAB — SARS-COV-2 RNA NOSE QL NAA+PROBE: NOT DETECTED

## 2020-09-10 ENCOUNTER — ANESTHESIA (OUTPATIENT)
Dept: GASTROENTEROLOGY | Facility: HOSPITAL | Age: 70
End: 2020-09-10

## 2020-09-10 ENCOUNTER — ON CAMPUS - OUTPATIENT (AMBULATORY)
Dept: URBAN - METROPOLITAN AREA HOSPITAL 85 | Facility: HOSPITAL | Age: 70
End: 2020-09-10
Payer: MEDICARE

## 2020-09-10 ENCOUNTER — HOSPITAL ENCOUNTER (OUTPATIENT)
Facility: HOSPITAL | Age: 70
Setting detail: SURGERY ADMIT
Discharge: HOME | End: 2020-09-10
Attending: INTERNAL MEDICINE | Admitting: INTERNAL MEDICINE

## 2020-09-10 VITALS
DIASTOLIC BLOOD PRESSURE: 57 MMHG | RESPIRATION RATE: 12 BRPM | WEIGHT: 160 LBS | BODY MASS INDEX: 29.44 KG/M2 | OXYGEN SATURATION: 90 % | HEART RATE: 62 BPM | SYSTOLIC BLOOD PRESSURE: 118 MMHG | HEIGHT: 62 IN

## 2020-09-10 DIAGNOSIS — K22.70 BARRETT'S ESOPHAGUS WITHOUT DYSPLASIA: ICD-10-CM

## 2020-09-10 DIAGNOSIS — R13.10 DYSPHAGIA: ICD-10-CM

## 2020-09-10 DIAGNOSIS — R13.10 DYSPHAGIA, UNSPECIFIED: ICD-10-CM

## 2020-09-10 PROCEDURE — 43235 EGD DIAGNOSTIC BRUSH WASH: CPT | Performed by: INTERNAL MEDICINE

## 2020-09-10 PROCEDURE — 25010000002 PROPOFOL 10 MG/ML EMULSION: Performed by: ANESTHESIOLOGY

## 2020-09-10 PROCEDURE — 43450 DILATE ESOPHAGUS 1/MULT PASS: CPT | Performed by: INTERNAL MEDICINE

## 2020-09-10 PROCEDURE — 88305 TISSUE EXAM BY PATHOLOGIST: CPT | Performed by: INTERNAL MEDICINE

## 2020-09-10 PROCEDURE — 45350 SGMDSC W/BAND LIGATION: CPT | Performed by: INTERNAL MEDICINE

## 2020-09-10 RX ORDER — LIDOCAINE HYDROCHLORIDE 10 MG/ML
INJECTION, SOLUTION EPIDURAL; INFILTRATION; INTRACAUDAL; PERINEURAL AS NEEDED
Status: DISCONTINUED | OUTPATIENT
Start: 2020-09-10 | End: 2020-09-10 | Stop reason: SURG

## 2020-09-10 RX ORDER — SODIUM CHLORIDE 9 MG/ML
9 INJECTION, SOLUTION INTRAVENOUS CONTINUOUS PRN
Status: DISCONTINUED | OUTPATIENT
Start: 2020-09-10 | End: 2020-09-10 | Stop reason: HOSPADM

## 2020-09-10 RX ORDER — SODIUM CHLORIDE 0.9 % (FLUSH) 0.9 %
10 SYRINGE (ML) INJECTION EVERY 12 HOURS SCHEDULED
Status: DISCONTINUED | OUTPATIENT
Start: 2020-09-10 | End: 2020-09-10 | Stop reason: HOSPADM

## 2020-09-10 RX ORDER — PROPOFOL 10 MG/ML
VIAL (ML) INTRAVENOUS AS NEEDED
Status: DISCONTINUED | OUTPATIENT
Start: 2020-09-10 | End: 2020-09-10 | Stop reason: SURG

## 2020-09-10 RX ORDER — SODIUM CHLORIDE 0.9 % (FLUSH) 0.9 %
10 SYRINGE (ML) INJECTION AS NEEDED
Status: DISCONTINUED | OUTPATIENT
Start: 2020-09-10 | End: 2020-09-10 | Stop reason: HOSPADM

## 2020-09-10 RX ADMIN — PROPOFOL 150 MG: 10 INJECTION, EMULSION INTRAVENOUS at 10:33

## 2020-09-10 RX ADMIN — LIDOCAINE HYDROCHLORIDE 50 MG: 10 INJECTION, SOLUTION EPIDURAL; INFILTRATION; INTRACAUDAL; PERINEURAL at 10:33

## 2020-09-10 RX ADMIN — SODIUM CHLORIDE: 0.9 INJECTION, SOLUTION INTRAVENOUS at 10:27
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
## 2020-09-10 NOTE — ANESTHESIA PREPROCEDURE EVALUATION
Anesthesia Evaluation     Patient summary reviewed and Nursing notes reviewed   no history of anesthetic complications:  NPO Solid Status: > 8 hours  NPO Liquid Status: > 8 hours           Airway   Dental      Pulmonary    (+) sleep apnea,   Cardiovascular     ECG reviewed    (+) hypertension, past MI , CAD, cardiac stents hyperlipidemia,       Neuro/Psych  (+) psychiatric history Depression, dementia,     GI/Hepatic/Renal/Endo    (+)  GERD,      Musculoskeletal     (+) chronic pain,   Abdominal    Substance History      OB/GYN          Other        ROS/Med Hx Other: Mood d/o, Dysphagia, IBS, dorsalgia, psoriasis, confusion    Stress  · Left ventricular ejection fraction is hyperdynamic (Calculated EF > 70%).  · Myocardial perfusion imaging indicates a normal myocardial perfusion study with no evidence of ischemia.  · Impressions are consistent with a low risk study.    PSH  CORONARY ANGIOPLASTY WITH STENT PLACEMENT CARDIAC CATHETERIZATION  ANKLE ARTHROSCOPY W/ OPEN REPAIR BACK SURGERY  HYSTERECTOMY BREAST LUMPECTOMY                Anesthesia Plan    ASA 3     MAC   (Patient identified; pre-operative vital signs, all relevant labs/studies, complete medical/surgical/anesthetic history, full medication list, full allergy list, and NPO status obtained/reviewed; physical assessment performed; anesthetic options, side effects, potential complications, risks, and benefits discussed; questions answered; written anesthesia consent obtained; patient cleared for procedure; anesthesia machine and equipment checked and functioning)    Anesthetic plan, all risks, benefits, and alternatives have been provided, discussed and informed consent has been obtained with: patient.

## 2020-09-10 NOTE — OP NOTE
ESOPHAGOGASTRODUODENOSCOPY, SIGMOIDOSCOPY Procedure Report    Patient Name:  Katharina Brennan  YOB: 1950    Date of Surgery:  9/10/2020     Pre-Op Diagnosis:  Dysphagia [R13.10]       Post-Op Diagnosis Codes:     * Dysphagia [R13.10]  Hiatal hernia X 5 cm  Arguello's esophagitis X 7 cm (biopsied)  Stricture at 30 cm (improved, dilated to 58 Fr with mild trauma)  Normal stomach  Normal duodenum    Moderate hemorrhoids (internal and external), banded X 7  Prep was poor  Normal mucosa otherwise to sigmoid colon  Mild L diverticulosis    Procedure/CPT® Codes:      Procedure(s):  ESOPHAGOGASTRODUODENOSCOPY WITH BOUGIE DILATION 58 Fr  FLEX SIG WITH HEMORROID BANDING    Staff:  Surgeon(s):  Sarahi Hoskins MD         Anesthesia: Monitored Anesthesia Care    Implants:    Nothing was implanted during the procedure    Specimen:        See Below    Complications:  NONE    EBL = NONE    Description of Procedure:  Informed consent was obtained for the procedure, including sedation.  Risks of perforation, hemorrhage, adverse drug reaction and aspiration were discussed.  The patient was brought into the endoscopy suite. Continuous cardiopulmonary monitoring was performed. The patient was placed in the left lateral decubitus position.  The bite block was inserted into the patient's mouth. After adequate sedation was attained, the Olympus gastroscope was inserted into the patient's mouth and advanced to the second portion of the duodenum without difficulty.  Circumferential examination was performed. A retroflex exam was performed in the patient's stomach.  On completion of the exam, the bowel was decompressed, the scope was removed from the patient, the patient tolerated the procedure well, there were no immediate post-operative complications.        * Dysphagia [R13.10]  Hiatal hernia X 5 cm  Arguello's esophagitis X 7 cm (biopsied)  Stricture at 30 cm (improved, dilated to 58 Fr with mild trauma)  Normal  stomach  Normal duodenum    Moderate hemorrhoids (internal and external), banded X 7  Prep was poor  Normal mucosa otherwise to sigmoid colon  Mild L diverticulosis    Impression:     * Dysphagia [R13.10]  Hiatal hernia X 5 cm  Arguello's esophagitis X 7 cm (biopsied)  Stricture at 30 cm (improved, dilated to 58 Fr with mild trauma)  Normal stomach  Normal duodenum    Moderate hemorrhoids (internal and external), banded X 7  Prep was poor  Normal mucosa otherwise to sigmoid colon  Mild L diverticulosis    Recommendations:  Repeat EGD 1 year for dilation 60 Fr next time  Repeat hemorrhoidal banding 1 year with Flex.Sig.  Miralax daily X 7D  PPI long term  Await path    Marcelina MD     Date: 9/10/2020  Time: 10:50

## 2020-09-10 NOTE — ANESTHESIA POSTPROCEDURE EVALUATION
Patient: Katharina Brennan    Procedure Summary     Date:  09/10/20 Room / Location:  [facility] ENDOSCOPY 1 / [facility] ENDOSCOPY    Anesthesia Start:  1027 Anesthesia Stop:  1055    Procedures:       ESOPHAGOGASTRODUODENOSCOPY (N/A )      FLEX SIG WITH HEMORROID BANDING (N/A Anus) Diagnosis:       Dysphagia      (Dysphagia [R13.10])    Surgeon:  Sarahi Hoskins MD Provider:  Rubén Hernandez MD    Anesthesia Type:  MAC ASA Status:  3          Anesthesia Type: MAC    Vitals  Vitals Value Taken Time   BP 97/51 9/10/2020 10:56 AM   Temp     Pulse 64 9/10/2020 10:55 AM   Resp     SpO2 100 % 9/10/2020 10:55 AM   Vitals shown include unvalidated device data.        Post Anesthesia Care and Evaluation    Patient location during evaluation: PACU  Patient participation: complete - patient participated  Level of consciousness: awake  Pain scale: See nurse's notes for pain score.  Pain management: adequate  Airway patency: patent  Anesthetic complications: No anesthetic complications  PONV Status: none  Cardiovascular status: acceptable  Respiratory status: acceptable  Hydration status: acceptable    Comments: Patient seen and examined postoperatively; vital signs stable; SpO2 greater than or equal to 90%; cardiopulmonary status stable; nausea/vomiting adequately controlled; pain adequately controlled; no apparent anesthesia complications; patient discharged from anesthesia care when discharge criteria were met

## 2020-09-11 LAB
LAB AP CASE REPORT: NORMAL
PATH REPORT.FINAL DX SPEC: NORMAL
PATH REPORT.GROSS SPEC: NORMAL
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
## 2023-10-30 ENCOUNTER — OFFICE VISIT (OUTPATIENT)
Dept: PAIN MEDICINE | Facility: CLINIC | Age: 73
End: 2023-10-30
Payer: MEDICARE

## 2023-10-30 ENCOUNTER — PATIENT ROUNDING (BHMG ONLY) (OUTPATIENT)
Dept: PAIN MEDICINE | Facility: CLINIC | Age: 73
End: 2023-10-30
Payer: MEDICARE

## 2023-10-30 VITALS
BODY MASS INDEX: 49.69 KG/M2 | RESPIRATION RATE: 16 BRPM | HEART RATE: 63 BPM | DIASTOLIC BLOOD PRESSURE: 66 MMHG | SYSTOLIC BLOOD PRESSURE: 141 MMHG | HEIGHT: 62 IN | OXYGEN SATURATION: 100 % | WEIGHT: 270 LBS

## 2023-10-30 DIAGNOSIS — Z98.1 S/P LUMBAR SPINAL FUSION: ICD-10-CM

## 2023-10-30 DIAGNOSIS — M53.3 SACROILIAC JOINT DYSFUNCTION: Primary | ICD-10-CM

## 2023-10-30 DIAGNOSIS — E66.01 MORBID (SEVERE) OBESITY DUE TO EXCESS CALORIES: ICD-10-CM

## 2023-10-30 DIAGNOSIS — M47.816 LUMBAR SPONDYLOSIS: ICD-10-CM

## 2023-10-30 RX ORDER — PRAVASTATIN SODIUM 40 MG
TABLET ORAL
COMMUNITY

## 2023-10-30 RX ORDER — POTASSIUM CHLORIDE 750 MG/1
TABLET, EXTENDED RELEASE ORAL
COMMUNITY

## 2023-10-30 RX ORDER — MELATONIN 10 MG
CAPSULE ORAL
COMMUNITY
Start: 2023-10-21

## 2023-10-30 RX ORDER — MELOXICAM 15 MG/1
TABLET ORAL
COMMUNITY
Start: 2023-10-25 | End: 2023-10-30

## 2023-10-30 RX ORDER — BACLOFEN 5 MG/1
TABLET ORAL
COMMUNITY
Start: 2023-10-29

## 2023-10-30 RX ORDER — PREGABALIN 100 MG/1
CAPSULE ORAL
COMMUNITY
Start: 2023-07-05

## 2023-10-30 RX ORDER — ESCITALOPRAM OXALATE 10 MG/1
TABLET ORAL
COMMUNITY

## 2023-10-30 RX ORDER — LEVOTHYROXINE SODIUM 0.03 MG/1
TABLET ORAL
COMMUNITY
Start: 2023-10-18

## 2023-10-30 RX ORDER — ALBUTEROL SULFATE 90 UG/1
AEROSOL, METERED RESPIRATORY (INHALATION)
COMMUNITY

## 2023-10-30 RX ORDER — ERGOCALCIFEROL 1.25 MG/1
CAPSULE ORAL
COMMUNITY

## 2023-10-30 RX ORDER — OXYCODONE AND ACETAMINOPHEN 7.5; 325 MG/1; MG/1
TABLET ORAL
COMMUNITY
End: 2023-10-30

## 2023-10-30 RX ORDER — OXYCODONE HYDROCHLORIDE AND ACETAMINOPHEN 5; 325 MG/1; MG/1
TABLET ORAL
COMMUNITY
End: 2023-10-30

## 2023-10-30 RX ORDER — ACETAMINOPHEN AND CODEINE PHOSPHATE 300; 15 MG/1; MG/1
TABLET ORAL
COMMUNITY
Start: 2023-10-23

## 2023-10-30 RX ORDER — FUROSEMIDE 20 MG/1
TABLET ORAL
COMMUNITY

## 2023-10-30 RX ORDER — TRAZODONE HYDROCHLORIDE 50 MG/1
TABLET ORAL
COMMUNITY

## 2023-10-30 NOTE — PROGRESS NOTES
October 30, 2023    Hello, may I speak with Katharina Brennan?    My name is    I am  with [facility]  [address]  [place] [address].    Before we get started may I verify your date of birth? 1950    I am calling to officially welcome you to our practice and ask about your recent visit. Is this a good time to talk?     Tell me about your visit with us. What things went well?  Everything went well.       We're always looking for ways to make our patients' experiences even better. Do you have recommendations on ways we may improve?  no    Overall were you satisfied with your first visit to our practice? yes       I appreciate you taking the time to speak with me today. Is there anything else I can do for you? no      Thank you, and have a great day.

## 2023-11-07 ENCOUNTER — TELEPHONE (OUTPATIENT)
Dept: PAIN MEDICINE | Facility: CLINIC | Age: 73
End: 2023-11-07
Payer: MEDICARE

## 2023-11-07 NOTE — TELEPHONE ENCOUNTER
Caller: VI Gardner call back number: [phone]    Type of visit: INJECTION     Additional notes: NEEDS TO RESCHEDULE FOR AFTER 3:45PM

## 2023-11-17 ENCOUNTER — HOSPITAL ENCOUNTER (OUTPATIENT)
Dept: PAIN MEDICINE | Facility: HOSPITAL | Age: 73
Discharge: HOME | End: 2023-11-17
Payer: MEDICARE

## 2023-11-17 ENCOUNTER — HOSPITAL ENCOUNTER (EMERGENCY)
Facility: HOSPITAL | Age: 73
Discharge: HOME | End: 2023-11-17
Attending: EMERGENCY MEDICINE
Payer: MEDICARE

## 2023-11-17 ENCOUNTER — APPOINTMENT (OUTPATIENT)
Dept: CT IMAGING | Facility: HOSPITAL | Age: 73
End: 2023-11-17
Payer: MEDICARE

## 2023-11-17 ENCOUNTER — APPOINTMENT (OUTPATIENT)
Dept: GENERAL RADIOLOGY | Facility: HOSPITAL | Age: 73
End: 2023-11-17
Payer: MEDICARE

## 2023-11-17 VITALS
SYSTOLIC BLOOD PRESSURE: 178 MMHG | HEIGHT: 62 IN | WEIGHT: 270 LBS | HEART RATE: 77 BPM | RESPIRATION RATE: 17 BRPM | OXYGEN SATURATION: 100 % | DIASTOLIC BLOOD PRESSURE: 77 MMHG | TEMPERATURE: 98.2 F | BODY MASS INDEX: 49.69 KG/M2

## 2023-11-17 VITALS
WEIGHT: 270 LBS | DIASTOLIC BLOOD PRESSURE: 110 MMHG | HEIGHT: 62 IN | HEART RATE: 70 BPM | TEMPERATURE: 97.1 F | SYSTOLIC BLOOD PRESSURE: 200 MMHG | OXYGEN SATURATION: 97 % | BODY MASS INDEX: 49.69 KG/M2 | RESPIRATION RATE: 18 BRPM

## 2023-11-17 DIAGNOSIS — M53.3 SACROILIAC JOINT DYSFUNCTION: Primary | ICD-10-CM

## 2023-11-17 DIAGNOSIS — R52 PAIN: ICD-10-CM

## 2023-11-17 DIAGNOSIS — J32.4 CHRONIC PANSINUSITIS: ICD-10-CM

## 2023-11-17 DIAGNOSIS — R51.9 ACUTE NONINTRACTABLE HEADACHE, UNSPECIFIED HEADACHE TYPE: ICD-10-CM

## 2023-11-17 DIAGNOSIS — I10 PRIMARY HYPERTENSION: Primary | ICD-10-CM

## 2023-11-17 LAB
ALBUMIN SERPL-MCNC: 4.3 G/DL (ref 3.5–5.2)
ALBUMIN/GLOB SERPL: 1.3 G/DL
ALP SERPL-CCNC: 127 U/L (ref 39–117)
ALT SERPL W P-5'-P-CCNC: 17 U/L (ref 1–33)
ANION GAP SERPL CALCULATED.3IONS-SCNC: 12 MMOL/L (ref 5–15)
AST SERPL-CCNC: 25 U/L (ref 1–32)
BASOPHILS # BLD AUTO: 0 10*3/MM3 (ref 0–0.2)
BASOPHILS NFR BLD AUTO: 0.3 % (ref 0–1.5)
BILIRUB SERPL-MCNC: 0.2 MG/DL (ref 0–1.2)
BUN SERPL-MCNC: 22 MG/DL (ref 8–23)
BUN/CREAT SERPL: 20.6 (ref 7–25)
CALCIUM SPEC-SCNC: 9.4 MG/DL (ref 8.6–10.5)
CHLORIDE SERPL-SCNC: 97 MMOL/L (ref 98–107)
CO2 SERPL-SCNC: 23 MMOL/L (ref 22–29)
CREAT SERPL-MCNC: 1.07 MG/DL (ref 0.57–1)
DEPRECATED RDW RBC AUTO: 46.4 FL (ref 37–54)
EGFRCR SERPLBLD CKD-EPI 2021: 54.6 ML/MIN/1.73
EOSINOPHIL # BLD AUTO: 0.1 10*3/MM3 (ref 0–0.4)
EOSINOPHIL NFR BLD AUTO: 1.5 % (ref 0.3–6.2)
ERYTHROCYTE [DISTWIDTH] IN BLOOD BY AUTOMATED COUNT: 13.8 % (ref 12.3–15.4)
GLOBULIN UR ELPH-MCNC: 3.2 GM/DL
GLUCOSE SERPL-MCNC: 124 MG/DL (ref 65–99)
HCT VFR BLD AUTO: 35.6 % (ref 34–46.6)
HGB BLD-MCNC: 11.8 G/DL (ref 12–15.9)
HOLD SPECIMEN: NORMAL
LYMPHOCYTES # BLD AUTO: 0.5 10*3/MM3 (ref 0.7–3.1)
LYMPHOCYTES NFR BLD AUTO: 8.7 % (ref 19.6–45.3)
MCH RBC QN AUTO: 30.1 PG (ref 26.6–33)
MCHC RBC AUTO-ENTMCNC: 33.1 G/DL (ref 31.5–35.7)
MCV RBC AUTO: 90.9 FL (ref 79–97)
MONOCYTES # BLD AUTO: 0.3 10*3/MM3 (ref 0.1–0.9)
MONOCYTES NFR BLD AUTO: 5.5 % (ref 5–12)
NEUTROPHILS NFR BLD AUTO: 4.4 10*3/MM3 (ref 1.7–7)
NEUTROPHILS NFR BLD AUTO: 84 % (ref 42.7–76)
NRBC BLD AUTO-RTO: 0.1 /100 WBC (ref 0–0.2)
NT-PROBNP SERPL-MCNC: 457.8 PG/ML (ref 0–900)
PLATELET # BLD AUTO: 224 10*3/MM3 (ref 140–450)
PMV BLD AUTO: 8.1 FL (ref 6–12)
POTASSIUM SERPL-SCNC: 4.7 MMOL/L (ref 3.5–5.2)
PROT SERPL-MCNC: 7.5 G/DL (ref 6–8.5)
RBC # BLD AUTO: 3.92 10*6/MM3 (ref 3.77–5.28)
SODIUM SERPL-SCNC: 132 MMOL/L (ref 136–145)
TROPONIN T SERPL HS-MCNC: 12 NG/L
WBC NRBC COR # BLD AUTO: 5.2 10*3/MM3 (ref 3.4–10.8)

## 2023-11-17 PROCEDURE — 36415 COLL VENOUS BLD VENIPUNCTURE: CPT | Performed by: NURSE PRACTITIONER

## 2023-11-17 PROCEDURE — 77003 FLUOROGUIDE FOR SPINE INJECT: CPT

## 2023-11-17 PROCEDURE — 96375 TX/PRO/DX INJ NEW DRUG ADDON: CPT

## 2023-11-17 PROCEDURE — 71045 X-RAY EXAM CHEST 1 VIEW: CPT

## 2023-11-17 PROCEDURE — 85025 COMPLETE CBC W/AUTO DIFF WBC: CPT | Performed by: NURSE PRACTITIONER

## 2023-11-17 PROCEDURE — 25010000002 BUPIVACAINE (PF) 0.25 % SOLUTION: Performed by: STUDENT IN AN ORGANIZED HEALTH CARE EDUCATION/TRAINING PROGRAM

## 2023-11-17 PROCEDURE — 70450 CT HEAD/BRAIN W/O DYE: CPT

## 2023-11-17 PROCEDURE — 99284 EMERGENCY DEPT VISIT MOD MDM: CPT

## 2023-11-17 PROCEDURE — 84484 ASSAY OF TROPONIN QUANT: CPT | Performed by: EMERGENCY MEDICINE

## 2023-11-17 PROCEDURE — 93005 ELECTROCARDIOGRAM TRACING: CPT | Performed by: EMERGENCY MEDICINE

## 2023-11-17 PROCEDURE — 25010000002 METHYLPREDNISOLONE PER 80 MG: Performed by: STUDENT IN AN ORGANIZED HEALTH CARE EDUCATION/TRAINING PROGRAM

## 2023-11-17 PROCEDURE — 83880 ASSAY OF NATRIURETIC PEPTIDE: CPT | Performed by: EMERGENCY MEDICINE

## 2023-11-17 PROCEDURE — 80053 COMPREHEN METABOLIC PANEL: CPT | Performed by: NURSE PRACTITIONER

## 2023-11-17 PROCEDURE — 36415 COLL VENOUS BLD VENIPUNCTURE: CPT

## 2023-11-17 PROCEDURE — 25510000001 IOPAMIDOL 41 % SOLUTION: Performed by: STUDENT IN AN ORGANIZED HEALTH CARE EDUCATION/TRAINING PROGRAM

## 2023-11-17 PROCEDURE — 96374 THER/PROPH/DIAG INJ IV PUSH: CPT

## 2023-11-17 PROCEDURE — 25010000002 PROCHLORPERAZINE 10 MG/2ML SOLUTION: Performed by: EMERGENCY MEDICINE

## 2023-11-17 RX ORDER — BUPIVACAINE HYDROCHLORIDE 2.5 MG/ML
10 INJECTION, SOLUTION EPIDURAL; INFILTRATION; INTRACAUDAL ONCE
Status: COMPLETED | OUTPATIENT
Start: 2023-11-17 | End: 2023-11-17

## 2023-11-17 RX ORDER — LIDOCAINE HYDROCHLORIDE 10 MG/ML
5 INJECTION, SOLUTION EPIDURAL; INFILTRATION; INTRACAUDAL; PERINEURAL ONCE
Status: COMPLETED | OUTPATIENT
Start: 2023-11-17 | End: 2023-11-17

## 2023-11-17 RX ORDER — METHYLPREDNISOLONE ACETATE 80 MG/ML
80 INJECTION, SUSPENSION INTRA-ARTICULAR; INTRALESIONAL; INTRAMUSCULAR; SOFT TISSUE ONCE
Status: COMPLETED | OUTPATIENT
Start: 2023-11-17 | End: 2023-11-17

## 2023-11-17 RX ORDER — LABETALOL HYDROCHLORIDE 5 MG/ML
10 INJECTION, SOLUTION INTRAVENOUS ONCE
Status: COMPLETED | OUTPATIENT
Start: 2023-11-17 | End: 2023-11-17

## 2023-11-17 RX ORDER — SODIUM CHLORIDE 0.9 % (FLUSH) 0.9 %
10 SYRINGE (ML) INJECTION AS NEEDED
Status: DISCONTINUED | OUTPATIENT
Start: 2023-11-17 | End: 2023-11-17 | Stop reason: HOSPADM

## 2023-11-17 RX ORDER — PROCHLORPERAZINE EDISYLATE 5 MG/ML
5 INJECTION INTRAMUSCULAR; INTRAVENOUS ONCE
Status: COMPLETED | OUTPATIENT
Start: 2023-11-17 | End: 2023-11-17

## 2023-11-17 RX ORDER — IOPAMIDOL 408 MG/ML
3 INJECTION, SOLUTION INTRATHECAL
Status: COMPLETED | OUTPATIENT
Start: 2023-11-17 | End: 2023-11-17

## 2023-11-17 RX ADMIN — BUPIVACAINE HYDROCHLORIDE 10 ML: 2.5 INJECTION, SOLUTION EPIDURAL; INFILTRATION; INTRACAUDAL; PERINEURAL at 15:55

## 2023-11-17 RX ADMIN — LIDOCAINE HYDROCHLORIDE 5 ML: 10 INJECTION, SOLUTION EPIDURAL; INFILTRATION; INTRACAUDAL; PERINEURAL at 15:49

## 2023-11-17 RX ADMIN — Medication 10 MG: at 18:57

## 2023-11-17 RX ADMIN — PROCHLORPERAZINE EDISYLATE 5 MG: 5 INJECTION INTRAMUSCULAR; INTRAVENOUS at 18:56

## 2023-11-17 RX ADMIN — IOPAMIDOL 3 ML: 408 INJECTION, SOLUTION INTRATHECAL at 15:55

## 2023-11-17 RX ADMIN — METHYLPREDNISOLONE ACETATE 80 MG: 80 INJECTION, SUSPENSION INTRA-ARTICULAR; INTRALESIONAL; INTRAMUSCULAR; SOFT TISSUE at 15:55

## 2023-11-17 NOTE — ED NOTES
Chief Complaint   Patient presents with    Elevated Blood Pressure     Elevated blood pressure headache, blurred vision. Sent from pain medication       Pt states she had a sacroiliac injection bilaterally today by Dr. Riddle and she became hypertensive. Pt states she's SOB, but denies chest pain.

## 2023-11-17 NOTE — PROGRESS NOTES
Postoperatively patient's blood pressure was found to be elevated.  Initially was 220/110.  Patient denies any chest pain.  She does have headache today.  Denies any blurry vision.  Blood pressure was checked on both arms and with manual cuff multiple times and it was consistently 220 and systolic and diastolic 102-110.  Patient states that she has taken her blood pressure medications this morning as well.  She had elevated high blood pressure long time ago but her blood pressure has been very well controlled as per patient.  Due to hypertensive urgency, recommend going to the ER.  Patient was transferred to ER safely.    Jarrod Riddle DO  Pain Management   [facility]

## 2023-11-17 NOTE — H&P
H and P reviewed from previous visit and no changes to patient's clinical presentation. Will proceed with procedure as planned. Patient denies history of DM and being on blood thinners.    Jarrod Riddle DO  Pain Management   [facility]

## 2023-11-17 NOTE — ED PROVIDER NOTES
Subjective     Provider in Triage Note  Due to significant overcrowding in the emergency department patient was initially seen and evaluated in triage.  Provider in triage recommended patient placement in the treatment area to initiate therapy and movement to an ER bed as soon as possible.    Patient is a 73-year-old obese white female with history of chronic back pain and hypertension who presents today with reports of headache and elevated blood pressure.  She was at the pain clinic today and received SI injection however postinjection her blood pressure fell to be elevated 220s over 110s.  She does report significant headache today.  No dizziness or visual changes.  No unilateral weakness or deficit.  She states that she did take her blood pressure medication this morning.  She denies chest pain or shortness of breath.      History of Present Illness  Patient reports she felt she had some blurred vision after she got here.  She was breathing hard although is not short of breath.  She has no localized numbness weakness paresthesia.  She is having no chest pain.  No nausea vomiting.  Review of Systems    Past Medical History:   Diagnosis Date    Back pain     CAD (coronary artery disease)     Confusion     Early onset Alzheimer's dementia     GERD (gastroesophageal reflux disease)     Hypertension     Mood disorder     Psoriasis        Allergies   Allergen Reactions    Iodine Anaphylaxis and Swelling    Methotrexate Derivatives Anaphylaxis    Naproxen Swelling    Methotrexate Other (See Comments)    Sulfa Antibiotics Unknown - High Severity    Sulfamethoxazole Hives and Swelling    Morphine Other (See Comments)     Neurologic       Past Surgical History:   Procedure Laterality Date    ANKLE ARTHROSCOPY W/ OPEN REPAIR      BACK SURGERY      x3    BREAST LUMPECTOMY Right     cyst only    CARDIAC CATHETERIZATION      Dr Pinzon    CORONARY ANGIOPLASTY WITH STENT PLACEMENT      ENDOSCOPY N/A 9/10/2020    Procedure:  ESOPHAGOGASTRODUODENOSCOPY with dilation (bougie 48, 52, 56, 58) and biopsy x 1 area ;  Surgeon: Sarahi Hoskins MD;  Location: [facility] ENDOSCOPY;  Service: Gastroenterology;  Laterality: N/A;  hiatal hernia, esophageal stricture    HYSTERECTOMY      SIGMOIDOSCOPY N/A 9/10/2020    Procedure: FLEX SIG WITH HEMORROID BANDING x7;  Surgeon: Sarahi Hoskins MD;  Location: [facility] ENDOSCOPY;  Service: Gastroenterology;  Laterality: N/A;  hemorroids poor prep       Family History   Problem Relation Age of Onset    No Known Problems Mother     No Known Problems Father        Social History     Socioeconomic History    Marital status: Single   Tobacco Use    Smoking status: Never    Smokeless tobacco: Never   Vaping Use    Vaping Use: Unknown   Substance and Sexual Activity    Alcohol use: Not Currently    Drug use: Never    Sexual activity: Defer     Prior to Admission medications    Medication Sig Start Date End Date Taking? Authorizing Provider   acetaminophen-codeine (TYLENOL with CODEINE #2) 300-15 MG tablet  10/23/23   Rui Yost MD   albuterol sulfate  (90 Base) MCG/ACT inhaler albuterol sulfate HFA 90 mcg/actuation aerosol inhaler    Rui Yost MD   Baclofen (LIORESAL) 5 MG tablet  10/29/23   Rui Yost MD   diazePAM (VALIUM) 5 MG tablet Take 1 tablet by mouth 3 (Three) Times a Day.    Rui Yost MD   diphenoxylate-atropine (LOMOTIL) 2.5-0.025 MG per tablet Take 1 tablet by mouth 2 (Two) Times a Day.    Rui Yost MD   donepezil (ARICEPT) 10 MG tablet Take 1 tablet by mouth Every Night.    Rui Yost MD   DULoxetine (CYMBALTA) 60 MG capsule Take 1 capsule by mouth 2 (Two) Times a Day.    Rui Yost MD   Enulose 10 GM/15ML solution solution (encephalopathy)  9/3/20   Rui Yost MD   ergocalciferol (ERGOCALCIFEROL) 1.25 MG (76793 UT) capsule ergocalciferol (vitamin D2) 1,250 mcg (50,000 unit) capsule    Priyank  MD Rui   escitalopram (LEXAPRO) 10 MG tablet escitalopram 10 mg tablet   TAKE 1 TABLET BY MOUTH ONCE DAILY    Rui Yost MD   furosemide (LASIX) 20 MG tablet furosemide 20 mg tablet    Rui Yost MD   lactulose (CHRONULAC) 10 GM/15ML solution Take 30 mL by mouth 2 (Two) Times a Day As Needed.    Rui Yost MD   levothyroxine (SYNTHROID, LEVOTHROID) 25 MCG tablet  10/18/23   Rui Yost MD   lisinopril (PRINIVIL,ZESTRIL) 20 MG tablet Take 1 tablet by mouth As Needed. [facility] monitor BP and gives as needed    Rui Yost MD   magnesium oxide (MAG-OX) 400 MG tablet Take 1 tablet by mouth 2 (Two) Times a Day.    Rui Yost MD   Melatonin 10 MG capsule  10/21/23   Rui Yost MD   omeprazole (priLOSEC) 40 MG capsule Take 1 capsule by mouth Daily.    Rui Yost MD   oxyCODONE (ROXICODONE) 10 MG tablet Take 1 tablet by mouth Every 6 (Six) Hours As Needed for Moderate Pain.  Patient not taking: Reported on 10/30/2023    Rui Yost MD   pantoprazole (PROTONIX) 40 MG EC tablet Take 1 tablet by mouth 2 (Two) Times a Day. 2/21/20   Inge Kline MD   potassium chloride (K-DUR,KLOR-CON) 10 MEQ CR tablet potassium chloride ER 10 mEq tablet,extended release(part/cryst)   TAKE 1 TABLET BY MOUTH ONCE DAILY FOR SUPPLEMENT    Rui Yost MD   pravastatin (PRAVACHOL) 40 MG tablet pravastatin 40 mg tablet   TAKE 1 TABLET BY MOUTH AT BEDTIME    Rui Yost MD   pregabalin (LYRICA) 100 MG capsule  7/5/23   Rui Yost MD   traZODone (DESYREL) 50 MG tablet trazodone 50 mg tablet    Rui Yost MD   ubrogepant (Ubrelvy) 100 MG tablet Ubrelvy 100 mg tablet   Take 1 tablet PO x 1, may repeat x 1 after 2 hours    Rui Yost MD           Objective   Physical Exam  73-year-old female awake alert.  Generally obese.  Pupils equal round react light.  Extraocular muscles are intact no  facial asymmetry.  Neck supple chest clear cardiovascular regular rhythm abdomen soft nontender extremities without tenderness or edema.  Neurologic exam without focal findings noted.  Motor or sensory grossly intact extremities hands without pronator drift or nose intact speech clear.  Procedures           ED Course      Results for orders placed or performed during the hospital encounter of 11/17/23   Comprehensive Metabolic Panel    Specimen: Hand, Left; Blood   Result Value Ref Range    Glucose 124 (H) 65 - 99 mg/dL    BUN 22 8 - 23 mg/dL    Creatinine 1.07 (H) 0.57 - 1.00 mg/dL    Sodium 132 (L) 136 - 145 mmol/L    Potassium 4.7 3.5 - 5.2 mmol/L    Chloride 97 (L) 98 - 107 mmol/L    CO2 23.0 22.0 - 29.0 mmol/L    Calcium 9.4 8.6 - 10.5 mg/dL    Total Protein 7.5 6.0 - 8.5 g/dL    Albumin 4.3 3.5 - 5.2 g/dL    ALT (SGPT) 17 1 - 33 U/L    AST (SGOT) 25 1 - 32 U/L    Alkaline Phosphatase 127 (H) 39 - 117 U/L    Total Bilirubin 0.2 0.0 - 1.2 mg/dL    Globulin 3.2 gm/dL    A/G Ratio 1.3 g/dL    BUN/Creatinine Ratio 20.6 7.0 - 25.0    Anion Gap 12.0 5.0 - 15.0 mmol/L    eGFR 54.6 (L) >60.0 mL/min/1.73   CBC Auto Differential    Specimen: Arm, Right; Blood   Result Value Ref Range    WBC 5.20 3.40 - 10.80 10*3/mm3    RBC 3.92 3.77 - 5.28 10*6/mm3    Hemoglobin 11.8 (L) 12.0 - 15.9 g/dL    Hematocrit 35.6 34.0 - 46.6 %    MCV 90.9 79.0 - 97.0 fL    MCH 30.1 26.6 - 33.0 pg    MCHC 33.1 31.5 - 35.7 g/dL    RDW 13.8 12.3 - 15.4 %    RDW-SD 46.4 37.0 - 54.0 fl    MPV 8.1 6.0 - 12.0 fL    Platelets 224 140 - 450 10*3/mm3    Neutrophil % 84.0 (H) 42.7 - 76.0 %    Lymphocyte % 8.7 (L) 19.6 - 45.3 %    Monocyte % 5.5 5.0 - 12.0 %    Eosinophil % 1.5 0.3 - 6.2 %    Basophil % 0.3 0.0 - 1.5 %    Neutrophils, Absolute 4.40 1.70 - 7.00 10*3/mm3    Lymphocytes, Absolute 0.50 (L) 0.70 - 3.10 10*3/mm3    Monocytes, Absolute 0.30 0.10 - 0.90 10*3/mm3    Eosinophils, Absolute 0.10 0.00 - 0.40 10*3/mm3    Basophils, Absolute 0.00 0.00 -  "0.20 10*3/mm3    nRBC 0.1 0.0 - 0.2 /100 WBC   High Sensitivity Troponin T    Specimen: Hand, Left; Blood   Result Value Ref Range    HS Troponin T 12 <14 ng/L   BNP    Specimen: Hand, Left; Blood   Result Value Ref Range    proBNP 457.8 0.0 - 900.0 pg/mL   ECG 12 Lead Dyspnea   Result Value Ref Range    QT Interval 423 ms    QTC Interval 459 ms   Gold Top - SST   Result Value Ref Range    Extra Tube Hold for add-ons.      XR Chest 1 View    Result Date: 11/17/2023  Impression: Borderline heart shadow enlargement and mild to moderate diffuse pulmonary interstitial disease pattern. Early pulmonary interstitial edema is a consideration. Viral syndrome/atypical pneumonia could cause a similar pattern. Electronically Signed: William Solano MD  11/17/2023 7:11 PM EST  Workstation ID: [id number]    CT Head Without Contrast    Result Date: 11/17/2023  Impression: No acute intracranial abnormality. Extensive frontal and ethmoid sinus disease without air-fluid levels. Electronically Signed: Kj Ball DO  11/17/2023 6:23 PM EST  Workstation ID: [id number]   Medications   sodium chloride 0.9 % flush 10 mL (has no administration in time range)   prochlorperazine (COMPAZINE) injection 5 mg (5 mg Intravenous Given 11/17/23 1856)   labetalol (NORMODYNE,TRANDATE) injection 10 mg (10 mg Intravenous Given 11/17/23 1857)     /77   Pulse 77   Temp 98.2 °F (36.8 °C) (Oral)   Resp 17   Ht 157.5 cm (62\")   Wt 122 kg (270 lb)   SpO2 100%   BMI 49.38 kg/m²                                        Medical Decision Making  Amount and/or Complexity of Data Reviewed  Labs: ordered.  Radiology: ordered.  ECG/medicine tests: ordered.    Risk  Prescription drug management.    Chart review: Patient had SI joint injection by pain management earlier today  Comorbidity: As per past history   Differential: Hypertension, congestive heart failure, cerebral hemorrhage,  My EKG interpretation: Sinus rhythm left axis deviation.  Compared to previous " no significant change noted rate of 71  Lab: BNP normal 457 troponin normal 12 CBC no significant abnormality hemoglobin 11.8 comprehensive metabolic panel glucose 124 with BUN 22 creatinine 1.07 sodium 132.  My Radiology review and interpretation: Chest x-ray reveals poor inspiration cardiomegaly.  There is some did interstitial disease pattern this may be related to poor inspiration.  Edema is a possibility.  CT scan of head shows no acute intracranial abnormality.  There is partial opacification of the frontal and ethmoid air cells.  There is some mucosal thickening in the right maxillary and sphenoid sinuses.  There are no air-fluid levels  Discussion/treatment: Patient's findings were discussed with her.  She reports longstanding problems with sinus congestion.  She states she is on a no spray for this.  She has never had ENT evaluation.  Her blood pressure has improved to 178/77.  Her headache has resolved she feels improved she desires to go home.  She was discharged.  Advised to follow-up with primary provider.  She was given ENT referral.  Return new or worsening symptoms  Patient was evaluated using appropriate PPE      Final diagnoses:   Primary hypertension   Acute nonintractable headache, unspecified headache type   Chronic pansinusitis       ED Disposition  ED Disposition       ED Disposition   Discharge    Condition   Stable    Comment   --               Mara Russell MD  [address]  [facility] [address]  [phone]          [facility]  [address]  [facility] [address]  [phone]             Medication List      No changes were made to your prescriptions during this visit.            Vishnu Murray MD  11/17/23 5110

## 2023-11-17 NOTE — PROCEDURES
PREOPERATIVE DIAGNOSIS:    B/l SI Joint Arthropathy      POSTOPERATIVE DIAGNOSIS:  Same.     PROCEDURE: b/l sacroiliac joint steroid injection     PROCEDURE IN DETAIL:  The patient was placed in a prone position and the lower back was prepped with chloraprep and draped in the usual sterile fashion.  The skin overlaying the left SI joint was infiltrated with 1% lidocaine for local anesthesia.  A 22-gauge 3.5 inch spinal needle was inserted through the skin under fluoroscopic guidance until we got to the lower third of the SI joint. Another needle was placed in carin upper third of the joint. 2 cc of 0.25% marcaine with depo-medrol was injected at each level. Same steps were repeated on right side. A total of 8 cc of 0.25% marcaine with 80 mg of depo-medrol was injected.     After the procedure the needles were flushed with preservative free local anesthetic and removed. Skin was cleaned and a sterile dressing was applied.    Following the procedure the patient's vital signs were stable. The patient was discharged home in good condition after being given discharge instructions.    COMPLICATIONS: None    Jarrod Riddle DO  Pain Management   [facility]

## 2023-11-18 LAB
QT INTERVAL: 423 MS
QTC INTERVAL: 459 MS

## 2023-11-22 ENCOUNTER — TELEPHONE (OUTPATIENT)
Dept: PAIN MEDICINE | Facility: HOSPITAL | Age: 73
End: 2023-11-22
Payer: MEDICARE

## 2023-11-22 NOTE — TELEPHONE ENCOUNTER
Spoke with nurse Nissa from [place]. She states the patient is doing much better after her injection with Dr. Riddle.
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
## 2023-11-29 ENCOUNTER — OFFICE VISIT (OUTPATIENT)
Dept: PAIN MEDICINE | Facility: CLINIC | Age: 73
End: 2023-11-29
Payer: MEDICARE

## 2023-11-29 VITALS
WEIGHT: 270 LBS | HEART RATE: 62 BPM | OXYGEN SATURATION: 98 % | RESPIRATION RATE: 16 BRPM | BODY MASS INDEX: 49.38 KG/M2 | DIASTOLIC BLOOD PRESSURE: 58 MMHG | SYSTOLIC BLOOD PRESSURE: 130 MMHG

## 2023-11-29 DIAGNOSIS — M53.3 SACROILIAC JOINT DYSFUNCTION: Primary | ICD-10-CM

## 2023-11-29 DIAGNOSIS — M47.816 LUMBAR SPONDYLOSIS: ICD-10-CM

## 2023-11-29 DIAGNOSIS — E66.01 MORBID (SEVERE) OBESITY DUE TO EXCESS CALORIES: ICD-10-CM

## 2023-11-29 DIAGNOSIS — Z98.1 S/P LUMBAR SPINAL FUSION: ICD-10-CM

## 2023-11-29 RX ORDER — GABAPENTIN 800 MG/1
TABLET ORAL
COMMUNITY

## 2023-11-29 RX ORDER — ACETAMINOPHEN 325 MG/1
TABLET ORAL
COMMUNITY
Start: 2023-11-23

## 2024-01-12 ENCOUNTER — OFFICE (AMBULATORY)
Dept: URBAN - METROPOLITAN AREA CLINIC 64 | Facility: CLINIC | Age: 74
End: 2024-01-12

## 2024-01-12 VITALS
HEIGHT: 62 IN | HEART RATE: 63 BPM | WEIGHT: 293 LBS | SYSTOLIC BLOOD PRESSURE: 122 MMHG | DIASTOLIC BLOOD PRESSURE: 68 MMHG

## 2024-01-12 DIAGNOSIS — Z86.010 PERSONAL HISTORY OF COLONIC POLYPS: ICD-10-CM

## 2024-01-12 DIAGNOSIS — K21.9 GASTRO-ESOPHAGEAL REFLUX DISEASE WITHOUT ESOPHAGITIS: ICD-10-CM

## 2024-01-12 DIAGNOSIS — R11.0 NAUSEA: ICD-10-CM

## 2024-01-12 DIAGNOSIS — R10.32 LEFT LOWER QUADRANT PAIN: ICD-10-CM

## 2024-01-12 DIAGNOSIS — K59.00 CONSTIPATION, UNSPECIFIED: ICD-10-CM

## 2024-01-12 DIAGNOSIS — K22.70 BARRETT'S ESOPHAGUS WITHOUT DYSPLASIA: ICD-10-CM

## 2024-01-12 DIAGNOSIS — R13.10 DYSPHAGIA, UNSPECIFIED: ICD-10-CM

## 2024-01-12 PROCEDURE — 99204 OFFICE O/P NEW MOD 45 MIN: CPT | Performed by: NURSE PRACTITIONER

## 2024-01-12 RX ORDER — LACTULOSE 10 G/15ML
600 SOLUTION ORAL
Qty: 900 | Refills: 11 | Status: ACTIVE
Start: 2024-01-12

## 2024-01-12 RX ORDER — DICYCLOMINE HYDROCHLORIDE 10 MG/1
40 CAPSULE ORAL
Qty: 60 | Refills: 11 | Status: COMPLETED
Start: 2024-01-12 | End: 2024-08-09

## 2024-03-27 ENCOUNTER — HOSPITAL ENCOUNTER (OUTPATIENT)
Dept: MRI IMAGING | Facility: HOSPITAL | Age: 74
Discharge: HOME | End: 2024-03-27
Admitting: STUDENT IN AN ORGANIZED HEALTH CARE EDUCATION/TRAINING PROGRAM
Payer: MEDICARE

## 2024-03-27 VITALS — BODY MASS INDEX: 51.21 KG/M2 | WEIGHT: 280 LBS

## 2024-03-27 DIAGNOSIS — Z98.1 S/P LUMBAR SPINAL FUSION: ICD-10-CM

## 2024-03-27 DIAGNOSIS — M47.816 LUMBAR SPONDYLOSIS: ICD-10-CM

## 2024-03-27 DIAGNOSIS — M53.3 SACROILIAC JOINT DYSFUNCTION: ICD-10-CM

## 2024-03-27 PROCEDURE — 72148 MRI LUMBAR SPINE W/O DYE: CPT

## 2024-03-28 ENCOUNTER — TELEPHONE (OUTPATIENT)
Dept: PAIN MEDICINE | Facility: CLINIC | Age: 74
End: 2024-03-28
Payer: MEDICARE

## 2024-03-28 NOTE — TELEPHONE ENCOUNTER
LMOM for case mgmt call to schedule follow up to go over MRI in office. Ok for hub to read and schedule.

## 2024-03-28 NOTE — TELEPHONE ENCOUNTER
Caller: AMARIS     Relationship to patient: CAREGIVER    Best call back number:     Chief complaint: BACK PAIN     Type of visit: FUP OIF MRI FROM [id number]    Requested date:      Additional notes:PLEASE CALL TO SCHEDULE FUP OF MRI TAKEN YESTERDAY
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
## 2024-04-03 ENCOUNTER — OFFICE VISIT (OUTPATIENT)
Dept: PAIN MEDICINE | Facility: CLINIC | Age: 74
End: 2024-04-03
Payer: MEDICARE

## 2024-04-03 VITALS
BODY MASS INDEX: 53.04 KG/M2 | SYSTOLIC BLOOD PRESSURE: 151 MMHG | OXYGEN SATURATION: 96 % | DIASTOLIC BLOOD PRESSURE: 77 MMHG | WEIGHT: 290 LBS | HEART RATE: 59 BPM | RESPIRATION RATE: 16 BRPM

## 2024-04-03 DIAGNOSIS — E66.01 MORBID (SEVERE) OBESITY DUE TO EXCESS CALORIES: ICD-10-CM

## 2024-04-03 DIAGNOSIS — M54.16 LUMBAR RADICULOPATHY: ICD-10-CM

## 2024-04-03 DIAGNOSIS — M47.816 LUMBAR SPONDYLOSIS: ICD-10-CM

## 2024-04-03 DIAGNOSIS — Z98.1 S/P LUMBAR SPINAL FUSION: Primary | ICD-10-CM

## 2024-04-03 PROCEDURE — 3078F DIAST BP <80 MM HG: CPT | Performed by: STUDENT IN AN ORGANIZED HEALTH CARE EDUCATION/TRAINING PROGRAM

## 2024-04-03 PROCEDURE — 1159F MED LIST DOCD IN RCRD: CPT | Performed by: STUDENT IN AN ORGANIZED HEALTH CARE EDUCATION/TRAINING PROGRAM

## 2024-04-03 PROCEDURE — 3077F SYST BP >= 140 MM HG: CPT | Performed by: STUDENT IN AN ORGANIZED HEALTH CARE EDUCATION/TRAINING PROGRAM

## 2024-04-03 PROCEDURE — 1125F AMNT PAIN NOTED PAIN PRSNT: CPT | Performed by: STUDENT IN AN ORGANIZED HEALTH CARE EDUCATION/TRAINING PROGRAM

## 2024-04-03 PROCEDURE — 99214 OFFICE O/P EST MOD 30 MIN: CPT | Performed by: STUDENT IN AN ORGANIZED HEALTH CARE EDUCATION/TRAINING PROGRAM

## 2024-04-03 PROCEDURE — 1160F RVW MEDS BY RX/DR IN RCRD: CPT | Performed by: STUDENT IN AN ORGANIZED HEALTH CARE EDUCATION/TRAINING PROGRAM

## 2024-04-03 RX ORDER — NALOXONE HYDROCHLORIDE 4 MG/.1ML
SPRAY NASAL
COMMUNITY

## 2024-04-03 RX ORDER — NYSTATIN 100000 [USP'U]/G
POWDER TOPICAL
COMMUNITY
Start: 2024-01-29

## 2024-04-03 NOTE — PATIENT INSTRUCTIONS
- STOP Tylenol #4 and Start Tramadol 50 mg TID PRN.  Will be prescribed by NP at nursing home.   - Patient has pain in the lower back with radicular pain in the leg, patient has failed conservative therapy including PT and pharmacological management for more than 6 weeks and pain interferes with activities of daily living. MRI shows moderate foraminal stenosis at multiple levels. Discussed caudal NADIYA, Discussed the possibility of infection, bleeding, nerve damage, post dural puncture headache, increased pain, paraplegia. Patient understands and agrees.

## 2024-04-10 RX ORDER — HYDRALAZINE HYDROCHLORIDE 10 MG/1
5 TABLET, FILM COATED ORAL 3 TIMES DAILY
COMMUNITY

## 2024-04-10 RX ORDER — LISINOPRIL 5 MG/1
5 TABLET ORAL DAILY
COMMUNITY

## 2024-04-10 RX ORDER — CETIRIZINE HYDROCHLORIDE 10 MG/1
10 TABLET ORAL DAILY
COMMUNITY

## 2024-04-10 RX ORDER — FLUTICASONE PROPIONATE 50 MCG
2 SPRAY, SUSPENSION (ML) NASAL DAILY
COMMUNITY

## 2024-04-10 RX ORDER — RISANKIZUMAB-RZAA 150 MG/ML
150 INJECTION SUBCUTANEOUS
COMMUNITY

## 2024-04-10 RX ORDER — DOCUSATE SODIUM 100 MG/1
100 CAPSULE, LIQUID FILLED ORAL 2 TIMES DAILY
COMMUNITY

## 2024-04-10 RX ORDER — DICYCLOMINE HYDROCHLORIDE 10 MG/1
10 CAPSULE ORAL
COMMUNITY

## 2024-04-10 RX ORDER — MELOXICAM 15 MG/1
15 TABLET ORAL DAILY
COMMUNITY

## 2024-04-10 RX ORDER — MONTELUKAST SODIUM 10 MG/1
10 TABLET ORAL NIGHTLY
COMMUNITY

## 2024-04-10 RX ORDER — POLYETHYLENE GLYCOL 3350 17 G/17G
17 POWDER, FOR SOLUTION ORAL DAILY
COMMUNITY

## 2024-04-18 ENCOUNTER — HOSPITAL ENCOUNTER (OUTPATIENT)
Dept: PAIN MEDICINE | Facility: HOSPITAL | Age: 74
Discharge: HOME | End: 2024-04-18
Payer: MEDICARE

## 2024-04-18 VITALS
RESPIRATION RATE: 16 BRPM | SYSTOLIC BLOOD PRESSURE: 159 MMHG | HEIGHT: 62 IN | TEMPERATURE: 96.9 F | BODY MASS INDEX: 53.37 KG/M2 | HEART RATE: 75 BPM | DIASTOLIC BLOOD PRESSURE: 74 MMHG | OXYGEN SATURATION: 99 % | WEIGHT: 290 LBS

## 2024-04-18 DIAGNOSIS — M54.16 LUMBAR RADICULOPATHY: Primary | ICD-10-CM

## 2024-04-18 DIAGNOSIS — R52 PAIN: ICD-10-CM

## 2024-04-18 PROCEDURE — 25010000002 METHYLPREDNISOLONE PER 40 MG: Performed by: STUDENT IN AN ORGANIZED HEALTH CARE EDUCATION/TRAINING PROGRAM

## 2024-04-18 PROCEDURE — 77003 FLUOROGUIDE FOR SPINE INJECT: CPT

## 2024-04-18 RX ORDER — IOPAMIDOL 408 MG/ML
3 INJECTION, SOLUTION INTRATHECAL
Status: DISCONTINUED | OUTPATIENT
Start: 2024-04-18 | End: 2024-04-18

## 2024-04-18 RX ORDER — METHYLPREDNISOLONE ACETATE 40 MG/ML
40 INJECTION, SUSPENSION INTRA-ARTICULAR; INTRALESIONAL; INTRAMUSCULAR; SOFT TISSUE ONCE
Status: COMPLETED | OUTPATIENT
Start: 2024-04-18 | End: 2024-04-18

## 2024-04-18 RX ADMIN — METHYLPREDNISOLONE ACETATE 40 MG: 40 INJECTION, SUSPENSION INTRA-ARTICULAR; INTRALESIONAL; INTRAMUSCULAR; INTRASYNOVIAL; SOFT TISSUE at 11:46

## 2024-04-18 NOTE — H&P
H and P reviewed from previous visit and no changes to patient's clinical presentation. Will proceed with procedure as planned. Patient denies history of DM and being on blood thinners.    Jarrod Riddle DO  Pain Management   [facility]

## 2024-04-18 NOTE — PROCEDURES
PREOPERATIVE DIAGNOS  1.         Lumbar DDD     POSTOPERATIVE DIAGNOSIS:  1.  Same     PROCEDURE:  Caudal Epidural Steroid Injection      PROCEDURE NOTE:  After obtaining written informed consent patient was taken to the procedure room. Pre-procedure blood pressure and pulse were stable and recorded in patients clinic chart.      The patient was placed in the prone position on fluoroscopy table.  The lower back was prepped with chloraprep times three and draped in the usual sterile fashion.  The skin over the sacral hiatus was identified under fluoroscopic guidance and infiltrated with 1% lidocaine for local anesthesia via 25 gauge needle.  An 20-g spinal needle was used to access the epidural space under fluoroscopic guidance. Contrast dye was not injected, as patient is allergic to iodine and contrast dye.   40 mg of depomedrol  with 4 cc of saline as injected. There was no evidence of CSF, paresthesia or heme. The needle was cleared with preservative free local anesthetic and removed. Skin was cleaned and a sterile dressing was applied.     Following the procedure the patient's vital signs were stable. The patient was discharged home in good condition after being given discharge instructions.     COMPLICATIONS: None     Jarrod Riddle DO  Pain Management   [facility]

## 2024-04-18 NOTE — DISCHARGE INSTRUCTIONS

## 2024-04-19 ENCOUNTER — TELEPHONE (OUTPATIENT)
Dept: PAIN MEDICINE | Facility: HOSPITAL | Age: 74
End: 2024-04-19
Payer: MEDICARE

## 2024-04-19 NOTE — TELEPHONE ENCOUNTER
Post procedure call made, spoke with patients nurse at facility. She reports patients pain level a #8.

## 2024-04-21 PROBLEM — K22.70 BARRETT'S ESOPHAGUS WITHOUT DYSPLASIA: Status: ACTIVE | Noted: 2024-04-21

## 2024-04-21 PROBLEM — Z86.010 PERSONAL HISTORY OF COLONIC POLYPS: Status: ACTIVE | Noted: 2024-04-21

## 2024-04-21 PROBLEM — Z86.0100 PERSONAL HISTORY OF COLONIC POLYPS: Status: ACTIVE | Noted: 2024-04-21

## 2024-04-21 NOTE — H&P
GI CONSULT  NOTE:    Referring Provider:    Mara Russell MD Matthew David McColloug*    Chief complaint: Personal history of colonic polyps    History of present illness:      Katharina Brennan is a 73 y.o. female who presents today for Procedure(s):  COLONOSCOPY  ESOPHAGOGASTRODUODENOSCOPY for the indications listed below.     The updated Patient Profile was reviewed prior to the procedure, in conjunction with the Physical Exam, including medical conditions, surgical procedures, medications, allergies, family history and social history.     Pre-operatively, I reviewed the indication(s) for the procedure, the risks of the procedure [including but not limited to: unexpected bleeding possibly requiring hospitalization and/or unplanned repeat procedures, perforation possibly requiring surgical treatment, missed lesions and complications of sedation/MAC (also explained by anesthesia staff)].     I have evaluated the patient for risks associated with the planned anesthesia and the procedure to be performed and find the patient an acceptable candidate for IV sedation.    Multiple opportunities were provided for any questions or concerns, and all questions were answered satisfactorily before any anesthesia was administered. We will proceed with the planned procedure.    Past Medical History:  Past Medical History:   Diagnosis Date    Back pain     CAD (coronary artery disease)     Confusion     Early onset Alzheimer's dementia     GERD (gastroesophageal reflux disease)     Hypertension     Mood disorder     Psoriasis        Past Surgical History:  Past Surgical History:   Procedure Laterality Date    ANKLE ARTHROSCOPY W/ OPEN REPAIR      BACK SURGERY      x3    BREAST LUMPECTOMY Right     cyst only    CARDIAC CATHETERIZATION      Dr Pinzon    CORONARY ANGIOPLASTY WITH STENT PLACEMENT      ENDOSCOPY N/A 9/10/2020    Procedure: ESOPHAGOGASTRODUODENOSCOPY with dilation (bougie 48, 52, 56, 58) and biopsy x 1 area ;  Surgeon:  Sarahi Hoskins MD;  Location: [facility] ENDOSCOPY;  Service: Gastroenterology;  Laterality: N/A;  hiatal hernia, esophageal stricture    HYSTERECTOMY      SIGMOIDOSCOPY N/A 9/10/2020    Procedure: FLEX SIG WITH HEMORROID BANDING x7;  Surgeon: Sarahi Hoskins MD;  Location: [facility] ENDOSCOPY;  Service: Gastroenterology;  Laterality: N/A;  hemorroids poor prep       Social History:  Social History     Tobacco Use    Smoking status: Never    Smokeless tobacco: Never   Vaping Use    Vaping status: Never Used   Substance Use Topics    Alcohol use: Not Currently    Drug use: Never       Family History:  Family History   Problem Relation Age of Onset    No Known Problems Mother     No Known Problems Father        Medications:  No medications prior to admission.       Scheduled Meds:  Continuous Infusions:No current facility-administered medications for this encounter.    PRN Meds:.    ALLERGIES:  Iodine, Methotrexate derivatives, Naproxen, Methotrexate, Sulfa antibiotics, Sulfamethoxazole, Trimethoprim, and Morphine    ROS:  The following systems were reviewed and negative;   Constitution:  No fevers, chills, no unintentional weight loss  Skin: no rash, no jaundice  Eyes:  No blurry vision, no eye pain  HENT:  No change in hearing or smell  Resp:  No dyspnea or cough  CV:  No chest pain or palpitations  :  No dysuria, hematuria  Musculoskeletal:  No leg cramps or arthralgias  Neuro:  No tremor, no numbness  Psych:  No depression or confusion    Objective     Vital Signs:   There were no vitals filed for this visit.    Physical Exam:     General Appearance:    Awake and alert, in no acute distress   Head:    Normocephalic, without obvious abnormality, atraumatic   Throat:   No oral lesions, no thrush, oral mucosa moist   Lungs:     respirations regular, even and unlabored   Skin:   No rash, no jaundice       Results Review:  Lab Results (last 24 hours)       ** No results found for the last 24 hours. **             Imaging Results (Last 24 Hours)       ** No results found for the last 24 hours. **             I reviewed the patient's labs and imaging.    ASSESSMENT AND PLAN:      Principal Problem:    Personal history of colonic polyps  Active Problems:    Arguello's esophagus without dysplasia       Procedure(s):  COLONOSCOPY  ESOPHAGOGASTRODUODENOSCOPY      I discussed the patients findings and my recommendations with the patient.    Electronically signed by Rico Allen MD, 04/21/24, 3:42 PM EDT.

## 2024-04-22 ENCOUNTER — HOSPITAL ENCOUNTER (OUTPATIENT)
Facility: HOSPITAL | Age: 74
Setting detail: HOSPITAL OUTPATIENT SURGERY
Discharge: HOME | End: 2024-04-22
Attending: INTERNAL MEDICINE | Admitting: INTERNAL MEDICINE
Payer: MEDICARE

## 2024-04-22 ENCOUNTER — ON CAMPUS - OUTPATIENT (AMBULATORY)
Dept: URBAN - METROPOLITAN AREA HOSPITAL 85 | Facility: HOSPITAL | Age: 74
End: 2024-04-22
Payer: MEDICAID

## 2024-04-22 ENCOUNTER — ANESTHESIA EVENT (OUTPATIENT)
Dept: GASTROENTEROLOGY | Facility: HOSPITAL | Age: 74
End: 2024-04-22
Payer: MEDICARE

## 2024-04-22 ENCOUNTER — ANESTHESIA (OUTPATIENT)
Dept: GASTROENTEROLOGY | Facility: HOSPITAL | Age: 74
End: 2024-04-22
Payer: MEDICARE

## 2024-04-22 VITALS
SYSTOLIC BLOOD PRESSURE: 136 MMHG | HEIGHT: 61 IN | OXYGEN SATURATION: 95 % | WEIGHT: 262.57 LBS | RESPIRATION RATE: 16 BRPM | HEART RATE: 59 BPM | BODY MASS INDEX: 49.57 KG/M2 | TEMPERATURE: 97.7 F | DIASTOLIC BLOOD PRESSURE: 55 MMHG

## 2024-04-22 DIAGNOSIS — D12.3 BENIGN NEOPLASM OF TRANSVERSE COLON: ICD-10-CM

## 2024-04-22 DIAGNOSIS — K59.00 CONSTIPATION: ICD-10-CM

## 2024-04-22 DIAGNOSIS — K21.9 GERD (GASTROESOPHAGEAL REFLUX DISEASE): ICD-10-CM

## 2024-04-22 DIAGNOSIS — K22.70 BARRETT'S ESOPHAGUS WITHOUT DYSPLASIA: ICD-10-CM

## 2024-04-22 DIAGNOSIS — Z90.49 ACQUIRED ABSENCE OF OTHER SPECIFIED PARTS OF DIGESTIVE TRACT: ICD-10-CM

## 2024-04-22 DIAGNOSIS — Z09 ENCOUNTER FOR FOLLOW-UP EXAMINATION AFTER COMPLETED TREATMEN: ICD-10-CM

## 2024-04-22 DIAGNOSIS — R10.9 ABDOMINAL PAIN: ICD-10-CM

## 2024-04-22 DIAGNOSIS — D12.2 BENIGN NEOPLASM OF ASCENDING COLON: ICD-10-CM

## 2024-04-22 DIAGNOSIS — K44.9 DIAPHRAGMATIC HERNIA WITHOUT OBSTRUCTION OR GANGRENE: ICD-10-CM

## 2024-04-22 DIAGNOSIS — R11.0 NAUSEA: ICD-10-CM

## 2024-04-22 DIAGNOSIS — Z86.010 PERSONAL HISTORY OF COLONIC POLYPS: ICD-10-CM

## 2024-04-22 DIAGNOSIS — R13.10 DYSPHAGIA: ICD-10-CM

## 2024-04-22 PROCEDURE — 43239 EGD BIOPSY SINGLE/MULTIPLE: CPT | Performed by: INTERNAL MEDICINE

## 2024-04-22 PROCEDURE — 25010000002 PROPOFOL 500 MG/50ML EMULSION: Performed by: NURSE ANESTHETIST, CERTIFIED REGISTERED

## 2024-04-22 PROCEDURE — 45385 COLONOSCOPY W/LESION REMOVAL: CPT | Mod: PT | Performed by: INTERNAL MEDICINE

## 2024-04-22 PROCEDURE — 25810000003 SODIUM CHLORIDE 0.9 % SOLUTION: Performed by: NURSE ANESTHETIST, CERTIFIED REGISTERED

## 2024-04-22 PROCEDURE — 88305 TISSUE EXAM BY PATHOLOGIST: CPT | Performed by: INTERNAL MEDICINE

## 2024-04-22 RX ORDER — LIDOCAINE HYDROCHLORIDE 20 MG/ML
INJECTION, SOLUTION INTRAVENOUS AS NEEDED
Status: DISCONTINUED | OUTPATIENT
Start: 2024-04-22 | End: 2024-04-22 | Stop reason: SURG

## 2024-04-22 RX ORDER — ONDANSETRON 2 MG/ML
4 INJECTION INTRAMUSCULAR; INTRAVENOUS ONCE AS NEEDED
Status: DISCONTINUED | OUTPATIENT
Start: 2024-04-22 | End: 2024-04-23 | Stop reason: HOSPADM

## 2024-04-22 RX ORDER — PROPOFOL 10 MG/ML
INJECTION, EMULSION INTRAVENOUS AS NEEDED
Status: DISCONTINUED | OUTPATIENT
Start: 2024-04-22 | End: 2024-04-22 | Stop reason: SURG

## 2024-04-22 RX ORDER — SODIUM CHLORIDE 9 MG/ML
INJECTION, SOLUTION INTRAVENOUS CONTINUOUS PRN
Status: DISCONTINUED | OUTPATIENT
Start: 2024-04-22 | End: 2024-04-22 | Stop reason: SURG

## 2024-04-22 RX ADMIN — PROPOFOL 125 MCG/KG/MIN: 10 INJECTION, EMULSION INTRAVENOUS at 11:46

## 2024-04-22 RX ADMIN — PROPOFOL 80 MG: 10 INJECTION, EMULSION INTRAVENOUS at 11:45

## 2024-04-22 RX ADMIN — LIDOCAINE HYDROCHLORIDE 100 MG: 20 INJECTION, SOLUTION INTRAVENOUS at 11:45

## 2024-04-22 RX ADMIN — SODIUM CHLORIDE: 9 INJECTION, SOLUTION INTRAVENOUS at 11:38

## 2024-04-22 NOTE — DISCHARGE INSTRUCTIONS
A responsible adult should stay with you and you should rest quietly for the rest of the day.    Do not drink alcohol, drive, operate any heavy machinery or power tools or make any legal/important decisions for the next 24 hours.     Progress your diet as tolerated.  If you begin to experience severe pain, increased shortness of breath, racing heartbeat or a fever above 101 F, seek immediate medical attention.     Follow up with MD as instructed. Call office for results in 3 to 5 days if needed.    [phone]    Findings:   Terminal ileum: Normal mucosa distal 10 cm  Colon: Normal mucosa to cecum.  Evidence of previous sigmoidectomy with widely patent anastomosis at 25 cm  3, sessile, 3 to 5 mm polyps removed from transverse and ascending colon with cold snare single piece polypectomy.     Impression:  EGD shows 4 cm segment of Arguello's esophagus and a 3 cm hiatal hernia.  Surveillance colonoscopy shows 3 colon polyps and evidence of previous sigmoidectomy     Recommendations:  Follow-up pathology  Repeat EGD in 3 years  Continue twice daily PPI  Recommend exercise and weight loss  No recall on colonoscopy  Follow-up with GI in 1 year

## 2024-04-22 NOTE — OP NOTE
COLONOSCOPY, ESOPHAGOGASTRODUODENOSCOPY Procedure Report    Patient Name:  Katharina Brennan  YOB: 1950    Date of Surgery:  4/22/2024     Preop diagnosis:  Arguello's esophagus without dysplasia  Gastroesophageal reflux disease  Personal history of colon polyps    Postop diagnosis:  Arguello's esophagus  3 cm hiatal hernia  Colon polyps x 3  Sigmoidectomy        Procedure(s):  COLONOSCOPY WITH SNARE POLYPECTOMY X 3  ESOPHAGOGASTRODUODENOSCOPY WITH BIOPSIES       Staff:  Surgeon(s):  Rico Allen MD      Anesthesia: Monitored Anesthesia Care    Implants:    Nothing was implanted during the procedure    Specimen:        See Below    Estimated blood loss: Minimal     Complications:  None    Description of Procedure:  Informed consent was obtained for the procedure, including sedation.  Risks of perforation, hemorrhage, adverse drug reaction and aspiration were discussed.  The patient was brought into the endoscopy suite. Continuous cardiopulmonary monitoring was performed. The patient was placed in the left lateral decubitus position.  The bite block was inserted into the patient's mouth. After adequate sedation was attained, the Olympus gastroscope was inserted into the patient's mouth and advanced to the second portion of the duodenum without difficulty.  Circumferential examination was performed. A retroflex exam was performed in the patient's stomach.  On completion of the exam, the bowel was decompressed, the scope was removed from the patient, the patient tolerated the procedure well, there were no immediate post-operative complications.     Examination of the esophagus: A 4 cm segment of salmon-colored mucosa was noted in the distal esophagus from 31 to 35 cm, semicircumferential without mucosal irregularities.  [place] classification C0M4.  Cold forceps biopsies were taken every 2 cm in 4 quadrants to evaluate for dysplasia.  A 3 cm hiatal hernia was noted from 35 to 38 cm.  Examination  of the stomach: Normal  Examination of the duodenum: Normal    Subsequently,  the Olympus colonoscope was inserted into the patient's rectum and advanced to the level of the cecum and terminal ileum without difficulty.  The bowel prep was good after intense irrigation.  Circumferential examination of the patient's colon was performed on scope withdrawal.  The cecum, ascending colon, and hepatic flexure were examined twice.  The transverse colon, splenic flexure, descending, sigmoid colon and rectum were examined.  A retroflex exam was performed in the rectum.  The bowel was decompressed, the scope was withdrawn from the patient, and the patient tolerated the procedure well. There were no immediate post-operative complications.     Findings:   Terminal ileum: Normal mucosa distal 10 cm  Colon: Normal mucosa to cecum.  Evidence of previous sigmoidectomy with widely patent anastomosis at 25 cm  3, sessile, 3 to 5 mm polyps removed from transverse and ascending colon with cold snare single piece polypectomy.    Impression:  EGD shows 4 cm segment of Arguello's esophagus and a 3 cm hiatal hernia.  Surveillance colonoscopy shows 3 colon polyps and evidence of previous sigmoidectomy    Recommendations:  Follow-up pathology  Repeat EGD in 3 years  Continue twice daily PPI  Recommend exercise and weight loss  No recall on colonoscopy  Follow-up with GI in 1 year    We appreciate the referral    Electronically signed by Rico Allen MD, 04/22/24, 12:19 PM EDT.

## 2024-04-22 NOTE — ANESTHESIA POSTPROCEDURE EVALUATION
Patient: Katharina Brennan    Procedure Summary       Date: 04/22/24 Room / Location: [facility] ENDOSCOPY 1 / [facility] ENDOSCOPY    Anesthesia Start: 1138 Anesthesia Stop: 1222    Procedures:       COLONOSCOPY WITH POLYPECTOMY X 3      ESOPHAGOGASTRODUODENOSCOPY WITH BIOPSIES Diagnosis:       Arguello's esophagus without dysplasia      Constipation      Dysphagia      GERD (gastroesophageal reflux disease)      Nausea      Personal history of colonic polyps      Abdominal pain      (Arguello's esophagus without dysplasia [K22.70])      (Constipation [K59.00])      (Dysphagia [R13.10])      (GERD (gastroesophageal reflux disease) [K21.9])      (Nausea [R11.0])      (Personal history of colonic polyps [Z86.010])      (Abdominal pain [R10.9])    Surgeons: Rico Allen MD Provider: Charissa Jenkins MD    Anesthesia Type: MAC ASA Status: 3            Anesthesia Type: MAC    Vitals  Vitals Value Taken Time   /41 04/22/24 1239   Temp     Pulse 58 04/22/24 1247   Resp 16 04/22/24 1246   SpO2 96 % 04/22/24 1247   Vitals shown include unfiled device data.        Post Anesthesia Care and Evaluation    Patient location during evaluation: PACU  Patient participation: complete - patient participated  Level of consciousness: awake and alert  Pain management: satisfactory to patient    Airway patency: patent  Anesthetic complications: No anesthetic complications  PONV Status: none  Cardiovascular status: acceptable  Respiratory status: acceptable  Hydration status: acceptable

## 2024-04-22 NOTE — ANESTHESIA PREPROCEDURE EVALUATION
Anesthesia Evaluation     Patient summary reviewed and Nursing notes reviewed   no history of anesthetic complications:   NPO Solid Status: > 8 hours  NPO Liquid Status: > 8 hours           Airway   Mallampati: II  TM distance: >3 FB  Neck ROM: limited  Dental    (+) edentulous    Pulmonary - normal exam   (+) ,sleep apnea on CPAP  Cardiovascular - normal exam    ECG reviewed    (+) hypertension, past MI , CAD, cardiac stents , hyperlipidemia      Neuro/Psych  (+) psychiatric history Depression, dementia  GI/Hepatic/Renal/Endo    (+) GERD    Musculoskeletal     (+) chronic pain  Abdominal    Substance History      OB/GYN          Other        ROS/Med Hx Other: Mood d/o, Dysphagia, IBS, dorsalgia, psoriasis, confusion    Stress  · Left ventricular ejection fraction is hyperdynamic (Calculated EF > 70%).  · Myocardial perfusion imaging indicates a normal myocardial perfusion study with no evidence of ischemia.  · Impressions are consistent with a low risk study.    PSH  CORONARY ANGIOPLASTY WITH STENT PLACEMENT CARDIAC CATHETERIZATION  ANKLE ARTHROSCOPY W/ OPEN REPAIR BACK SURGERY  HYSTERECTOMY BREAST LUMPECTOMY                Anesthesia Plan    ASA 3     MAC   total IV anesthesia  (Patient identified; pre-operative vital signs, all relevant labs/studies, complete medical/surgical/anesthetic history, full medication list, full allergy list, and NPO status obtained/reviewed; physical assessment performed; anesthetic options, side effects, potential complications, risks, and benefits discussed; questions answered; written anesthesia consent obtained; patient cleared for procedure; anesthesia machine and equipment checked and functioning)    Anesthetic plan, risks, benefits, and alternatives have been provided, discussed and informed consent has been obtained with: patient.    Plan discussed with CRNA.

## 2024-04-24 LAB
LAB AP CASE REPORT: NORMAL
LAB AP CLINICAL INFORMATION: NORMAL
LAB AP DIAGNOSIS COMMENT: NORMAL
PATH REPORT.FINAL DX SPEC: NORMAL
PATH REPORT.GROSS SPEC: NORMAL

## 2024-05-09 ENCOUNTER — OFFICE (AMBULATORY)
Dept: URBAN - METROPOLITAN AREA CLINIC 64 | Facility: CLINIC | Age: 74
End: 2024-05-09
Payer: MEDICAID

## 2024-05-09 VITALS
HEART RATE: 57 BPM | DIASTOLIC BLOOD PRESSURE: 68 MMHG | HEIGHT: 62 IN | WEIGHT: 293 LBS | SYSTOLIC BLOOD PRESSURE: 129 MMHG

## 2024-05-09 DIAGNOSIS — R10.32 LEFT LOWER QUADRANT PAIN: ICD-10-CM

## 2024-05-09 DIAGNOSIS — K21.9 GASTRO-ESOPHAGEAL REFLUX DISEASE WITHOUT ESOPHAGITIS: ICD-10-CM

## 2024-05-09 DIAGNOSIS — K59.00 CONSTIPATION, UNSPECIFIED: ICD-10-CM

## 2024-05-09 DIAGNOSIS — K22.70 BARRETT'S ESOPHAGUS WITHOUT DYSPLASIA: ICD-10-CM

## 2024-05-09 PROCEDURE — 99213 OFFICE O/P EST LOW 20 MIN: CPT | Performed by: NURSE PRACTITIONER

## 2024-05-09 RX ORDER — DICYCLOMINE HYDROCHLORIDE 10 MG/1
30 CAPSULE ORAL
Qty: 90 | Refills: 11 | Status: COMPLETED
Start: 2024-05-09 | End: 2024-08-09

## 2024-05-09 RX ORDER — POLYETHYLENE GLYCOL 3350 17 G/17G
17 POWDER, FOR SOLUTION ORAL
Qty: 1 | Refills: 11 | Status: COMPLETED
Start: 2024-05-09 | End: 2024-08-09
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
## 2024-05-13 ENCOUNTER — OFFICE VISIT (OUTPATIENT)
Dept: PAIN MEDICINE | Facility: CLINIC | Age: 74
End: 2024-05-13
Payer: MEDICARE

## 2024-05-13 VITALS
DIASTOLIC BLOOD PRESSURE: 72 MMHG | OXYGEN SATURATION: 99 % | BODY MASS INDEX: 50.83 KG/M2 | HEART RATE: 58 BPM | WEIGHT: 269 LBS | RESPIRATION RATE: 16 BRPM | SYSTOLIC BLOOD PRESSURE: 141 MMHG

## 2024-05-13 DIAGNOSIS — E66.01 MORBID (SEVERE) OBESITY DUE TO EXCESS CALORIES: ICD-10-CM

## 2024-05-13 DIAGNOSIS — Z98.1 S/P LUMBAR SPINAL FUSION: ICD-10-CM

## 2024-05-13 DIAGNOSIS — M96.1 POST LAMINECTOMY SYNDROME: Primary | ICD-10-CM

## 2024-05-13 DIAGNOSIS — M47.816 LUMBAR SPONDYLOSIS: ICD-10-CM

## 2024-05-13 DIAGNOSIS — M54.16 LUMBAR RADICULOPATHY: ICD-10-CM

## 2024-05-13 PROCEDURE — 3078F DIAST BP <80 MM HG: CPT | Performed by: STUDENT IN AN ORGANIZED HEALTH CARE EDUCATION/TRAINING PROGRAM

## 2024-05-13 PROCEDURE — 1159F MED LIST DOCD IN RCRD: CPT | Performed by: STUDENT IN AN ORGANIZED HEALTH CARE EDUCATION/TRAINING PROGRAM

## 2024-05-13 PROCEDURE — 1125F AMNT PAIN NOTED PAIN PRSNT: CPT | Performed by: STUDENT IN AN ORGANIZED HEALTH CARE EDUCATION/TRAINING PROGRAM

## 2024-05-13 PROCEDURE — 99214 OFFICE O/P EST MOD 30 MIN: CPT | Performed by: STUDENT IN AN ORGANIZED HEALTH CARE EDUCATION/TRAINING PROGRAM

## 2024-05-13 PROCEDURE — 3077F SYST BP >= 140 MM HG: CPT | Performed by: STUDENT IN AN ORGANIZED HEALTH CARE EDUCATION/TRAINING PROGRAM

## 2024-05-13 PROCEDURE — 1160F RVW MEDS BY RX/DR IN RCRD: CPT | Performed by: STUDENT IN AN ORGANIZED HEALTH CARE EDUCATION/TRAINING PROGRAM

## 2024-05-13 RX ORDER — ACETAMINOPHEN AND CODEINE PHOSPHATE 300; 30 MG/1; MG/1
TABLET ORAL
COMMUNITY
Start: 2024-04-29

## 2024-05-16 ENCOUNTER — TELEPHONE (OUTPATIENT)
Dept: PAIN MEDICINE | Facility: CLINIC | Age: 74
End: 2024-05-16
Payer: MEDICARE

## 2024-05-16 NOTE — TELEPHONE ENCOUNTER
Spoke to Rafaela, As for now they have continue the tylenol #4  NP is questioning due to morphine allergy. NP wants the ok from you before she fills it.

## 2024-05-16 NOTE — TELEPHONE ENCOUNTER
Caller: BRIDGET    Relationship: [facility]    Best call back number: [phone]    Who are you requesting to speak with (clinical staff, provider,  specific staff member): DR GAXIOLA    What was the call regarding: DR GAXIOLA SIGNED AN RX FOR TRAMADOL FOR THE PT AND BRIDGET FROM [facility] IS CALLING TO CLARIFY THE RX. THE PT HAS AN ALLERGY TO MORPHINE SO TRAMADOL WOULD NOT WORK FOR HER. PLEASE CONTACT [facility] ABOVE TO DISCUSS AND CLARIFY.

## 2024-05-17 ENCOUNTER — TELEPHONE (OUTPATIENT)
Dept: BARIATRICS/WEIGHT MGMT | Facility: CLINIC | Age: 74
End: 2024-05-17
Payer: MEDICARE

## 2024-05-17 NOTE — TELEPHONE ENCOUNTER
Called pt for referral/[facility]/spoke with /sent to nurse/nurse did not answer/ then paged nurse overhead/did not answer/will call back at later time

## 2024-05-21 NOTE — TELEPHONE ENCOUNTER
Allergy to morphine is low and she is currently getting codeine in Tylenol #4 without an issues. Recommend starting Tramadol with the advise to stop if there are any adverse effects.     Jarrod Riddle DO  Pain Management   [facility]
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
## 2024-07-10 ENCOUNTER — OFFICE VISIT (OUTPATIENT)
Dept: PAIN MEDICINE | Facility: CLINIC | Age: 74
End: 2024-07-10
Payer: MEDICARE

## 2024-07-10 VITALS
RESPIRATION RATE: 16 BRPM | WEIGHT: 269 LBS | SYSTOLIC BLOOD PRESSURE: 128 MMHG | HEART RATE: 65 BPM | DIASTOLIC BLOOD PRESSURE: 79 MMHG | BODY MASS INDEX: 50.83 KG/M2 | OXYGEN SATURATION: 97 %

## 2024-07-10 DIAGNOSIS — M96.1 POST LAMINECTOMY SYNDROME: ICD-10-CM

## 2024-07-10 DIAGNOSIS — M47.816 LUMBAR SPONDYLOSIS: ICD-10-CM

## 2024-07-10 DIAGNOSIS — M54.16 LUMBAR RADICULOPATHY: Primary | ICD-10-CM

## 2024-07-10 DIAGNOSIS — E66.01 MORBID (SEVERE) OBESITY DUE TO EXCESS CALORIES: ICD-10-CM

## 2024-07-10 DIAGNOSIS — Z98.1 S/P LUMBAR SPINAL FUSION: ICD-10-CM

## 2024-07-10 RX ORDER — LEVOCETIRIZINE DIHYDROCHLORIDE 5 MG/1
TABLET, FILM COATED ORAL
COMMUNITY
Start: 2024-06-28

## 2024-07-10 RX ORDER — TRAMADOL HYDROCHLORIDE 50 MG/1
TABLET ORAL
COMMUNITY
Start: 2024-07-04

## 2024-07-30 ENCOUNTER — HOSPITAL ENCOUNTER (OUTPATIENT)
Dept: PAIN MEDICINE | Facility: HOSPITAL | Age: 74
Discharge: HOME | End: 2024-07-30
Payer: MEDICARE

## 2024-07-30 VITALS
WEIGHT: 290 LBS | RESPIRATION RATE: 16 BRPM | OXYGEN SATURATION: 97 % | HEIGHT: 61 IN | SYSTOLIC BLOOD PRESSURE: 112 MMHG | TEMPERATURE: 97.1 F | HEART RATE: 62 BPM | DIASTOLIC BLOOD PRESSURE: 62 MMHG | BODY MASS INDEX: 54.75 KG/M2

## 2024-07-30 DIAGNOSIS — M54.16 LUMBAR RADICULOPATHY: Primary | ICD-10-CM

## 2024-07-30 DIAGNOSIS — R52 PAIN: ICD-10-CM

## 2024-07-30 PROCEDURE — 62323 NJX INTERLAMINAR LMBR/SAC: CPT | Performed by: STUDENT IN AN ORGANIZED HEALTH CARE EDUCATION/TRAINING PROGRAM

## 2024-07-30 PROCEDURE — 25510000001 IOPAMIDOL 41 % SOLUTION: Performed by: STUDENT IN AN ORGANIZED HEALTH CARE EDUCATION/TRAINING PROGRAM

## 2024-07-30 PROCEDURE — 25010000002 METHYLPREDNISOLONE PER 40 MG: Performed by: STUDENT IN AN ORGANIZED HEALTH CARE EDUCATION/TRAINING PROGRAM

## 2024-07-30 PROCEDURE — 77003 FLUOROGUIDE FOR SPINE INJECT: CPT

## 2024-07-30 RX ORDER — IOPAMIDOL 408 MG/ML
3 INJECTION, SOLUTION INTRATHECAL
Status: COMPLETED | OUTPATIENT
Start: 2024-07-30 | End: 2024-07-30

## 2024-07-30 RX ORDER — METHYLPREDNISOLONE ACETATE 40 MG/ML
40 INJECTION, SUSPENSION INTRA-ARTICULAR; INTRALESIONAL; INTRAMUSCULAR; SOFT TISSUE ONCE
Status: COMPLETED | OUTPATIENT
Start: 2024-07-30 | End: 2024-07-30

## 2024-07-30 RX ADMIN — IOPAMIDOL 3 ML: 408 INJECTION, SOLUTION INTRATHECAL at 15:42

## 2024-07-30 RX ADMIN — METHYLPREDNISOLONE ACETATE 40 MG: 40 INJECTION, SUSPENSION INTRA-ARTICULAR; INTRALESIONAL; INTRAMUSCULAR; INTRASYNOVIAL; SOFT TISSUE at 15:43

## 2024-07-30 NOTE — PROCEDURES
PREOPERATIVE DIAGNOSIS:    1. Lumbar DDD    POSTOPERATIVE DIAGNOSIS: Same    PROCEDURE:  Lumbar epidural steroid injection L 3-4    PROCEDURE NOTE:  After obtaining written informed consent patient was taken to the procedure room. Pre-procedure blood pressure and pulse were stable and recorded in patients clinic chart.     The patient was placed in the prone position. The lower back was prepped with antiseptic solution and draped in the usual sterile fashion.  The skin over the L 3-4 space was identified under fluoroscopic guidance and infiltrated with 1% lidocaine for local anesthesia via 25 gauge needle.  A 20-gauge tuohy needle was used to access the epidural space using loss of resistance to air technique. Following negative aspiration, 2 cc of the omnipaque dye was injected.  There was good spread of the dye from L3-L4 area. A mixture containing  3 ml of saline with 40 mg of depo-medrol was injected. There was no evidence of CSF, paresthesia or vascular spread. The needle was removed. Skin was cleaned and band aid was applied.    Following the procedure the patient's vital signs were stable. The patient was discharged home in good condition after being given discharge instructions.    COMPLICATIONS: None    Jarrod Riddle DO  Pain Management   [facility]

## 2024-07-30 NOTE — DISCHARGE INSTRUCTIONS

## 2024-07-30 NOTE — H&P
H and P reviewed from previous visit and no changes to patient's clinical presentation. Will proceed with procedure as planned. Patient denies history of DM and being on blood thinners.    Jarrod Riddle DO  Pain Management   [facility]

## 2024-08-06 ENCOUNTER — TELEPHONE (OUTPATIENT)
Dept: BARIATRICS/WEIGHT MGMT | Facility: CLINIC | Age: 74
End: 2024-08-06
Payer: MEDICARE

## 2024-08-09 ENCOUNTER — OFFICE (AMBULATORY)
Dept: URBAN - METROPOLITAN AREA CLINIC 64 | Facility: CLINIC | Age: 74
End: 2024-08-09
Payer: MEDICARE

## 2024-08-09 VITALS
WEIGHT: 272 LBS | SYSTOLIC BLOOD PRESSURE: 132 MMHG | HEART RATE: 58 BPM | HEIGHT: 62 IN | DIASTOLIC BLOOD PRESSURE: 65 MMHG

## 2024-08-09 DIAGNOSIS — K52.9 NONINFECTIVE GASTROENTERITIS AND COLITIS, UNSPECIFIED: ICD-10-CM

## 2024-08-09 DIAGNOSIS — R10.30 LOWER ABDOMINAL PAIN, UNSPECIFIED: ICD-10-CM

## 2024-08-09 PROCEDURE — 99213 OFFICE O/P EST LOW 20 MIN: CPT | Performed by: NURSE PRACTITIONER

## 2024-08-09 RX ORDER — DICYCLOMINE HYDROCHLORIDE 10 MG/1
20 CAPSULE ORAL
Qty: 60 | Refills: 11 | Status: ACTIVE
Start: 2024-08-09

## 2024-08-09 RX ORDER — LUBIPROSTONE 24 UG/1
24 CAPSULE, GELATIN COATED ORAL
Qty: 90 | Refills: 3 | Status: ACTIVE

## 2024-09-16 ENCOUNTER — OFFICE VISIT (OUTPATIENT)
Dept: PAIN MEDICINE | Facility: CLINIC | Age: 74
End: 2024-09-16
Payer: MEDICARE

## 2024-09-16 VITALS
RESPIRATION RATE: 16 BRPM | HEART RATE: 72 BPM | OXYGEN SATURATION: 97 % | DIASTOLIC BLOOD PRESSURE: 72 MMHG | SYSTOLIC BLOOD PRESSURE: 133 MMHG | BODY MASS INDEX: 54.8 KG/M2 | WEIGHT: 290 LBS

## 2024-09-16 DIAGNOSIS — M54.16 LUMBAR RADICULOPATHY: Primary | ICD-10-CM

## 2024-09-16 DIAGNOSIS — E66.01 MORBID (SEVERE) OBESITY DUE TO EXCESS CALORIES: ICD-10-CM

## 2024-09-16 DIAGNOSIS — M96.1 POST LAMINECTOMY SYNDROME: ICD-10-CM

## 2024-09-16 DIAGNOSIS — M47.816 LUMBAR SPONDYLOSIS: ICD-10-CM

## 2024-09-16 DIAGNOSIS — Z98.1 S/P LUMBAR SPINAL FUSION: ICD-10-CM

## 2024-09-16 PROCEDURE — 3075F SYST BP GE 130 - 139MM HG: CPT | Performed by: STUDENT IN AN ORGANIZED HEALTH CARE EDUCATION/TRAINING PROGRAM

## 2024-09-16 PROCEDURE — 1160F RVW MEDS BY RX/DR IN RCRD: CPT | Performed by: STUDENT IN AN ORGANIZED HEALTH CARE EDUCATION/TRAINING PROGRAM

## 2024-09-16 PROCEDURE — 99214 OFFICE O/P EST MOD 30 MIN: CPT | Performed by: STUDENT IN AN ORGANIZED HEALTH CARE EDUCATION/TRAINING PROGRAM

## 2024-09-16 PROCEDURE — 1159F MED LIST DOCD IN RCRD: CPT | Performed by: STUDENT IN AN ORGANIZED HEALTH CARE EDUCATION/TRAINING PROGRAM

## 2024-09-16 PROCEDURE — 3078F DIAST BP <80 MM HG: CPT | Performed by: STUDENT IN AN ORGANIZED HEALTH CARE EDUCATION/TRAINING PROGRAM

## 2024-09-16 PROCEDURE — 1125F AMNT PAIN NOTED PAIN PRSNT: CPT | Performed by: STUDENT IN AN ORGANIZED HEALTH CARE EDUCATION/TRAINING PROGRAM

## 2024-09-16 RX ORDER — ATORVASTATIN CALCIUM 20 MG/1
TABLET, FILM COATED ORAL
COMMUNITY
Start: 2024-09-06

## 2024-09-17 ENCOUNTER — TELEPHONE (OUTPATIENT)
Dept: PAIN MEDICINE | Facility: CLINIC | Age: 74
End: 2024-09-17
Payer: MEDICARE
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
## 2024-10-16 ENCOUNTER — OFFICE VISIT (OUTPATIENT)
Dept: PAIN MEDICINE | Facility: CLINIC | Age: 74
End: 2024-10-16
Payer: MEDICARE

## 2024-10-16 VITALS
RESPIRATION RATE: 16 BRPM | DIASTOLIC BLOOD PRESSURE: 75 MMHG | SYSTOLIC BLOOD PRESSURE: 165 MMHG | HEART RATE: 67 BPM | OXYGEN SATURATION: 97 %

## 2024-10-16 DIAGNOSIS — M53.3 SACROILIAC JOINT DYSFUNCTION: ICD-10-CM

## 2024-10-16 DIAGNOSIS — M47.816 LUMBAR SPONDYLOSIS: ICD-10-CM

## 2024-10-16 DIAGNOSIS — E66.01 MORBID (SEVERE) OBESITY DUE TO EXCESS CALORIES: ICD-10-CM

## 2024-10-16 DIAGNOSIS — M96.1 POST LAMINECTOMY SYNDROME: ICD-10-CM

## 2024-10-16 DIAGNOSIS — M54.16 LUMBAR RADICULOPATHY: Primary | ICD-10-CM

## 2024-10-16 DIAGNOSIS — Z98.1 S/P LUMBAR SPINAL FUSION: ICD-10-CM

## 2024-10-16 RX ORDER — HYDROCODONE BITARTRATE AND ACETAMINOPHEN 5; 325 MG/1; MG/1
TABLET ORAL
COMMUNITY
Start: 2024-10-09

## 2024-10-16 RX ORDER — TRAZODONE HYDROCHLORIDE 50 MG/1
TABLET, FILM COATED ORAL
COMMUNITY
Start: 2024-10-05

## 2024-10-16 NOTE — PATIENT INSTRUCTIONS
- Increase Norco  mg TID PRN.  - D/c tramadol  - Continue Lyrica 150 mg BID  - May benefit from starting anti depressant or seeing a psychiatrist. Defer to PCP

## 2024-11-15 ENCOUNTER — OFFICE (AMBULATORY)
Age: 74
End: 2024-11-15
Payer: MEDICAID

## 2024-11-15 ENCOUNTER — OFFICE (AMBULATORY)
Dept: URBAN - METROPOLITAN AREA CLINIC 64 | Facility: CLINIC | Age: 74
End: 2024-11-15
Payer: MEDICAID

## 2024-11-15 VITALS
DIASTOLIC BLOOD PRESSURE: 71 MMHG | WEIGHT: 259 LBS | SYSTOLIC BLOOD PRESSURE: 142 MMHG | WEIGHT: 259 LBS | SYSTOLIC BLOOD PRESSURE: 142 MMHG | HEIGHT: 62 IN | HEART RATE: 58 BPM | WEIGHT: 259 LBS | DIASTOLIC BLOOD PRESSURE: 71 MMHG | SYSTOLIC BLOOD PRESSURE: 142 MMHG | SYSTOLIC BLOOD PRESSURE: 142 MMHG | HEIGHT: 62 IN | WEIGHT: 259 LBS | SYSTOLIC BLOOD PRESSURE: 142 MMHG | HEIGHT: 62 IN | HEIGHT: 62 IN | HEART RATE: 58 BPM | DIASTOLIC BLOOD PRESSURE: 71 MMHG | HEIGHT: 62 IN | DIASTOLIC BLOOD PRESSURE: 71 MMHG | DIASTOLIC BLOOD PRESSURE: 71 MMHG | SYSTOLIC BLOOD PRESSURE: 142 MMHG | SYSTOLIC BLOOD PRESSURE: 142 MMHG | HEIGHT: 62 IN | WEIGHT: 259 LBS | HEART RATE: 58 BPM | HEART RATE: 58 BPM | DIASTOLIC BLOOD PRESSURE: 71 MMHG | HEART RATE: 58 BPM | WEIGHT: 259 LBS | HEART RATE: 58 BPM | WEIGHT: 259 LBS | DIASTOLIC BLOOD PRESSURE: 71 MMHG | HEIGHT: 62 IN | HEART RATE: 58 BPM

## 2024-11-15 DIAGNOSIS — K52.9 NONINFECTIVE GASTROENTERITIS AND COLITIS, UNSPECIFIED: ICD-10-CM

## 2024-11-15 DIAGNOSIS — R10.32 LEFT LOWER QUADRANT PAIN: ICD-10-CM

## 2024-11-15 PROCEDURE — 99213 OFFICE O/P EST LOW 20 MIN: CPT | Performed by: NURSE PRACTITIONER

## 2024-11-15 RX ORDER — LACTULOSE 10 G/15ML
600 SOLUTION ORAL
Qty: 900 | Refills: 11 | Status: COMPLETED
Start: 2024-01-12 | End: 2024-11-15

## 2024-11-15 RX ORDER — DICYCLOMINE HYDROCHLORIDE 10 MG/1
40 CAPSULE ORAL
Qty: 60 | Refills: 11 | Status: ACTIVE
Start: 2024-11-15

## 2024-11-15 RX ORDER — LOPERAMIDE HYDROCHLORIDE 2 MG/1
TABLET, FILM COATED ORAL
Qty: 180 | Refills: 10 | Status: ACTIVE
Start: 2024-11-15

## 2024-11-15 RX ORDER — DOCUSATE SODIUM 100 MG/1
TABLET, FILM COATED ORAL
Qty: 0 | Refills: 0 | Status: COMPLETED
End: 2024-11-15

## 2024-11-15 RX ORDER — LINACLOTIDE 290 UG/1
290 CAPSULE, GELATIN COATED ORAL
Qty: 0 | Refills: 0 | Status: COMPLETED
End: 2024-11-15
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
## 2024-12-18 ENCOUNTER — OFFICE VISIT (OUTPATIENT)
Dept: PAIN MEDICINE | Facility: CLINIC | Age: 74
End: 2024-12-18
Payer: MEDICARE

## 2024-12-18 VITALS
HEART RATE: 64 BPM | WEIGHT: 290 LBS | SYSTOLIC BLOOD PRESSURE: 138 MMHG | RESPIRATION RATE: 16 BRPM | BODY MASS INDEX: 54.8 KG/M2 | OXYGEN SATURATION: 97 % | DIASTOLIC BLOOD PRESSURE: 82 MMHG

## 2024-12-18 DIAGNOSIS — E66.01 MORBID (SEVERE) OBESITY DUE TO EXCESS CALORIES: ICD-10-CM

## 2024-12-18 DIAGNOSIS — M96.1 POST LAMINECTOMY SYNDROME: ICD-10-CM

## 2024-12-18 DIAGNOSIS — M47.816 LUMBAR SPONDYLOSIS: ICD-10-CM

## 2024-12-18 DIAGNOSIS — Z98.1 S/P LUMBAR SPINAL FUSION: ICD-10-CM

## 2024-12-18 DIAGNOSIS — M54.16 LUMBAR RADICULOPATHY: Primary | ICD-10-CM

## 2024-12-18 DIAGNOSIS — M53.3 SACROILIAC JOINT DYSFUNCTION: ICD-10-CM

## 2024-12-18 NOTE — PATIENT INSTRUCTIONS
- Increase Norco to  mg TID PRN  - START Meloxicam 7.5 mg daily with food for arthritic pain.   - Continue Lyrica as it is

## 2024-12-19 ENCOUNTER — OFFICE VISIT (OUTPATIENT)
Dept: BARIATRICS/WEIGHT MGMT | Facility: CLINIC | Age: 74
End: 2024-12-19
Payer: MEDICARE

## 2024-12-19 VITALS
HEIGHT: 62 IN | HEART RATE: 62 BPM | WEIGHT: 253.7 LBS | OXYGEN SATURATION: 96 % | SYSTOLIC BLOOD PRESSURE: 147 MMHG | DIASTOLIC BLOOD PRESSURE: 65 MMHG | BODY MASS INDEX: 46.69 KG/M2 | RESPIRATION RATE: 17 BRPM

## 2024-12-19 DIAGNOSIS — E66.01 OBESITY, CLASS III, BMI 40-49.9 (MORBID OBESITY): Primary | ICD-10-CM

## 2024-12-19 DIAGNOSIS — Z79.899 MEDICATION MANAGEMENT: ICD-10-CM

## 2024-12-19 RX ORDER — CARBOXYMETHYLCELLULOSE SODIUM 5 MG/ML
SOLUTION/ DROPS OPHTHALMIC 3 TIMES DAILY PRN
COMMUNITY

## 2024-12-19 RX ORDER — SEMAGLUTIDE 0.25 MG/.5ML
0.25 INJECTION, SOLUTION SUBCUTANEOUS WEEKLY
Qty: 2 ML | Refills: 0 | Status: SHIPPED | OUTPATIENT
Start: 2024-12-19

## 2024-12-19 RX ORDER — SERTRALINE HYDROCHLORIDE 100 MG/1
100 TABLET, FILM COATED ORAL DAILY
COMMUNITY

## 2024-12-19 RX ORDER — KETOCONAZOLE 20 MG/ML
SHAMPOO, SUSPENSION TOPICAL 2 TIMES WEEKLY
COMMUNITY

## 2024-12-19 RX ORDER — GALANTAMINE 4 MG/1
4 TABLET, FILM COATED ORAL 2 TIMES DAILY
COMMUNITY

## 2024-12-19 NOTE — PROGRESS NOTES
Nutrition Services    Patient Name: Katharina Brennan  YOB: 1950  MRN: [id number]  Date of Service: 12/19/24      ICD-10-CM ICD-9-CM   1. Obesity, Class III, BMI 40-49.9 (morbid obesity)  E66.01 278.01          NUTRITION ASSESSMENT - BARIATRIC SURGERY      Reason for Visit [facility] appt 1, new patient      H&P      Past Medical History:   Diagnosis Date    Anxiety     Back pain     CAD (coronary artery disease)     Confusion     Early onset Alzheimer's dementia     GERD (gastroesophageal reflux disease)     Hypertension     Mood disorder     Psoriasis     Thyroid disease        Past Surgical History:   Procedure Laterality Date    ANKLE ARTHROSCOPY W/ OPEN REPAIR      BACK SURGERY      x3    BREAST LUMPECTOMY Right     cyst only    CARDIAC CATHETERIZATION      Dr Pinzon    COLONOSCOPY N/A 4/22/2024    Procedure: COLONOSCOPY WITH POLYPECTOMY X 3;  Surgeon: Rico Allen MD;  Location: [facility];  Service: Gastroenterology;  Laterality: N/A;  POST- POLYPS    CORONARY ANGIOPLASTY WITH STENT PLACEMENT      ENDOSCOPY N/A 9/10/2020    Procedure: ESOPHAGOGASTRODUODENOSCOPY with dilation (bougie 48, 52, 56, 58) and biopsy x 1 area ;  Surgeon: Sarahi Hoskins MD;  Location: [facility];  Service: Gastroenterology;  Laterality: N/A;  hiatal hernia, esophageal stricture    ENDOSCOPY N/A 4/22/2024    Procedure: ESOPHAGOGASTRODUODENOSCOPY WITH BIOPSIES;  Surgeon: Rico Allen MD;  Location: [facility];  Service: Gastroenterology;  Laterality: N/A;  POST- BARRETTS ESOPHAGUS, HIATAL HERNIA    HYSTERECTOMY      SIGMOIDOSCOPY N/A 9/10/2020    Procedure: FLEX SIG WITH HEMORROID BANDING x7;  Surgeon: Sarahi Hoskins MD;  Location: [facility];  Service: Gastroenterology;  Laterality: N/A;  hemorroids poor prep        Previous Goals          Encounter Information        Visit Narrative     Patient lives in nursing home. All meals are provided for patient. Patient is open to  "cutting back on regular sodas as well as adding in some physical activity.     Diet Recall:   Breakfast: 4oz chooclate milk, cherrios with milk  Lunch: soup and sandwich, salad  Dinner: soup and sandwich, meat with sides  Snacks: no snacks  Beverages: milk, tea with sugar, coke 2/day, water    Exercise: no current exercise. Patient walks some with cane. Patient states nursing home does have therapy room she could go to.     Supplements:    Self Monitoring:          Anthropometrics        Current Height, Weight Height: 157.5 cm (62\")  Weight: 115 kg (253 lb 11.2 oz) (12/19/24 1122)       Trending Weight Hx      Wt Readings from Last 30 Encounters:   12/19/24 1122 115 kg (253 lb 11.2 oz)   12/18/24 1424 132 kg (290 lb)   09/16/24 1457 132 kg (290 lb)   07/30/24 1527 132 kg (290 lb)   07/10/24 1346 122 kg (269 lb)   05/13/24 1517 122 kg (269 lb)   04/22/24 1006 119 kg (262 lb 9.1 oz)   04/18/24 1131 132 kg (290 lb)   04/03/24 1143 132 kg (290 lb)   03/27/24 1300 127 kg (280 lb)   11/29/23 1008 122 kg (270 lb)   11/17/23 1538 122 kg (270 lb)   11/17/23 1643 122 kg (270 lb)   10/30/23 0956 122 kg (270 lb)   09/04/20 1044 72.6 kg (160 lb)   03/10/20 1110 68.9 kg (152 lb)   03/03/20 1109 69.9 kg (154 lb)   02/21/20 0500 76.3 kg (168 lb 3.4 oz)   02/20/20 0500 74.4 kg (164 lb 0.4 oz)   02/19/20 0500 73.6 kg (162 lb 4.1 oz)   02/18/20 1900 73.5 kg (162 lb 0.6 oz)   02/23/16 1244 85.3 kg (188 lb)      BMI kg/m2 Body mass index is 46.4 kg/m².       Nutrition Diagnosis         Nutrition Dx Statement Overweight/obesity RT multifactorial biochemical, behavioral and environmental contributors to disease AEB BMI 46.4 kg/m^2         Nutrition Intervention         Nutrition Intervention Nutrition education related to diet modification and physical activity        Monitor/Evaluation        New Goals Patient to add in physical activity 1x/week  Patient to cut back to 1 soda/day       Total time spent with pt 15 minutes of which 15 " minutes were spent on education.       Electronically signed by:  Kala Barreto RD  12/19/24 11:27 EST

## 2024-12-19 NOTE — PROGRESS NOTES
MGK BAR SURG [facility] GROUP BARIATRIC SURGERY  [address] [place] [address]  [address] [place] [address]  Dept: [phone]  12/19/2024      Katahrina Brennan.  [id number]  [id number]  1950  female      Chief Complaint of weight gain; unable to maintain weight loss    History of Present Illness:   Katharina is a 74 y.o. female who presents today for evaluation, education and consultation regarding medical weight management.      Diet History:See dietician/RN/MA documentation for complete history of weight and diet.          Past weight loss attempts: none       Pt is in a nursing home  Not able to prepare her own meals  States she does not eat much since she dose not like their food   Drinking a lot of soda     Past Medical History:   Diagnosis Date    Anxiety     Back pain     CAD (coronary artery disease)     Confusion     Early onset Alzheimer's dementia     GERD (gastroesophageal reflux disease)     Hypertension     Mood disorder     Psoriasis     Thyroid disease    RLS    Past Surgical History:   Procedure Laterality Date    ANKLE ARTHROSCOPY W/ OPEN REPAIR      BACK SURGERY      x3    BREAST LUMPECTOMY Right     cyst only    CARDIAC CATHETERIZATION      Dr Pinzon    COLONOSCOPY N/A 4/22/2024    Procedure: COLONOSCOPY WITH POLYPECTOMY X 3;  Surgeon: Rico Allen MD;  Location: [facility] ENDOSCOPY;  Service: Gastroenterology;  Laterality: N/A;  POST- POLYPS    CORONARY ANGIOPLASTY WITH STENT PLACEMENT      ENDOSCOPY N/A 9/10/2020    Procedure: ESOPHAGOGASTRODUODENOSCOPY with dilation (bougie 48, 52, 56, 58) and biopsy x 1 area ;  Surgeon: Sarahi Hoskins MD;  Location: [facility] ENDOSCOPY;  Service: Gastroenterology;  Laterality: N/A;  hiatal hernia, esophageal stricture    ENDOSCOPY N/A 4/22/2024    Procedure: ESOPHAGOGASTRODUODENOSCOPY WITH BIOPSIES;  Surgeon: Rico Allen MD;  Location: [facility] ENDOSCOPY;  Service:  Gastroenterology;  Laterality: N/A;  POST- BARRETTS ESOPHAGUS, HIATAL HERNIA    HYSTERECTOMY      SIGMOIDOSCOPY N/A 9/10/2020    Procedure: FLEX SIG WITH HEMORROID BANDING x7;  Surgeon: Sarahi Hoskins MD;  Location: [facility] ENDOSCOPY;  Service: Gastroenterology;  Laterality: N/A;  hemorroids poor prep   Cholecystectomy   Appendectomy     Allergies   Allergen Reactions    Iodine Anaphylaxis and Swelling    Methotrexate Derivatives Anaphylaxis    Naproxen Swelling    Methotrexate Other (See Comments)    Sulfa Antibiotics Unknown - High Severity    Sulfamethoxazole Hives and Swelling    Trimethoprim Other (See Comments)    Morphine Other (See Comments)     Neurologic         Current Outpatient Medications:     acetaminophen (TYLENOL) 325 MG tablet, Take 500 mg by mouth As Needed for Moderate Pain, Headache, Fever or Mild Pain., Disp: , Rfl:     albuterol sulfate  (90 Base) MCG/ACT inhaler, Inhale 2 puffs Every 4 (Four) Hours As Needed for Shortness of Air or Wheezing., Disp: , Rfl:     atorvastatin (LIPITOR) 20 MG tablet, , Disp: , Rfl:     carboxymethylcellulose (REFRESH PLUS) 0.5 % solution, 3 (Three) Times a Day As Needed for Dry Eyes., Disp: , Rfl:     docusate sodium (COLACE) 100 MG capsule, Take 1 capsule by mouth 2 (Two) Times a Day., Disp: , Rfl:     galantamine (RAZADYNE) 4 MG tablet, Take 1 tablet by mouth 2 (Two) Times a Day., Disp: , Rfl:     hydrALAZINE (APRESOLINE) 10 MG tablet, Take 0.5 tablets by mouth 3 (Three) Times a Day., Disp: , Rfl:     HYDROcodone-acetaminophen (NORCO) 5-325 MG per tablet, , Disp: , Rfl:     ketoconazole (NIZORAL) 2 % shampoo, Apply  topically to the appropriate area as directed 2 (Two) Times a Week., Disp: , Rfl:     lactulose (CHRONULAC) 10 GM/15ML solution, Take 30 mL by mouth Daily., Disp: , Rfl:     levothyroxine (SYNTHROID, LEVOTHROID) 25 MCG tablet, Take 1 tablet by mouth Every Morning., Disp: , Rfl:     linaclotide (LINZESS) 290 MCG capsule capsule, Take 1  capsule by mouth Every Morning Before Breakfast., Disp: , Rfl:     lisinopril (PRINIVIL,ZESTRIL) 5 MG tablet, Take 1 tablet by mouth Daily., Disp: , Rfl:     naloxone (Narcan) 4 MG/0.1ML nasal spray, , Disp: , Rfl:     pantoprazole (PROTONIX) 40 MG EC tablet, Take 1 tablet by mouth 2 (Two) Times a Day., Disp: 60 tablet, Rfl: 2    pregabalin (LYRICA) 100 MG capsule, Take 150 mg by mouth 3 times a day., Disp: , Rfl:     sertraline (ZOLOFT) 100 MG tablet, Take 1 tablet by mouth Daily., Disp: , Rfl:     traZODone (DESYREL) 50 MG tablet, , Disp: , Rfl:     acetaminophen-codeine (TYLENOL with CODEINE #3) 300-30 MG per tablet, , Disp: , Rfl:     cetirizine (zyrTEC) 10 MG tablet, Take 1 tablet by mouth Daily., Disp: , Rfl:     dicyclomine (BENTYL) 10 MG capsule, Take 1 capsule by mouth 4 (Four) Times a Day Before Meals & at Bedtime., Disp: , Rfl:     diphenoxylate-atropine (LOMOTIL) 2.5-0.025 MG per tablet, Take 1 tablet by mouth 2 (Two) Times a Day., Disp: , Rfl:     escitalopram (LEXAPRO) 10 MG tablet, Take 2 tablets by mouth Daily., Disp: , Rfl:     fluticasone (Flonase Allergy Relief) 50 MCG/ACT nasal spray, Administer 2 sprays into the nostril(s) as directed by provider Daily., Disp: , Rfl:     levocetirizine (XYZAL) 5 MG tablet, , Disp: , Rfl:     Melatonin 10 MG capsule, Take 1 capsule by mouth every night at bedtime., Disp: , Rfl:     meloxicam (MOBIC) 15 MG tablet, Take 1 tablet by mouth Daily., Disp: , Rfl:     montelukast (SINGULAIR) 10 MG tablet, Take 1 tablet by mouth Every Night., Disp: , Rfl:     polyethylene glycol (MIRALAX) 17 g packet, Take 17 g by mouth Daily., Disp: , Rfl:     Risankizumab-rzaa (Skyrizi) 150 MG/ML solution prefilled syringe, Inject 150 mg under the skin into the appropriate area as directed., Disp: , Rfl:     traMADol (ULTRAM) 50 MG tablet, , Disp: , Rfl:     Social History     Socioeconomic History    Marital status: Single   Tobacco Use    Smoking status: Never    Smokeless tobacco:  Never   Vaping Use    Vaping status: Never Used   Substance and Sexual Activity    Alcohol use: Not Currently    Drug use: Never    Sexual activity: Defer       Family History   Problem Relation Age of Onset    No Known Problems Mother     No Known Problems Father          Review of Systems:  Review of Systems   Constitutional:  Positive for fatigue and unexpected weight change.   Respiratory: Negative.     Cardiovascular:         HTN, stents in heart, CAD, HLD   Gastrointestinal:         Arguello's esophagus    Endocrine:        Hypothyroidism    Musculoskeletal:  Positive for back pain.   Skin:         Psoriasis    Neurological:  Positive for weakness.        Early onset dementia    Psychiatric/Behavioral:          Anxiety         Physical Exam:  Vital Signs:  Weight: 115 kg (253 lb 11.2 oz)   Body mass index is 46.4 kg/m².  Temp: (not recorded)   Heart Rate: 62   BP: 147/65     Physical Exam  Constitutional:       Appearance: Normal appearance. She is obese.   Pulmonary:      Effort: Pulmonary effort is normal.      Breath sounds: Normal breath sounds.   Abdominal:      General: Abdomen is flat.   Skin:     General: Skin is warm and dry.   Neurological:      Mental Status: She is alert. Mental status is at baseline.      Motor: Weakness present.   Psychiatric:         Mood and Affect: Mood normal.         Behavior: Behavior normal.         Thought Content: Thought content normal.     In wheel chair        Assessment:         Katharina Brennan is a 74 y.o. year old female with medically complicated severe obesity. Weight: 115 kg (253 lb 11.2 oz), Body mass index is 46.4 kg/m². and weight related problems.        The patient was advised to start a high protein, low fat and low carbohydrate diet. The patient was given individualized information  along with general group information and handouts.     The patient was encouraged to start routine exercise including but not limited to 150 minutes per week.     The  consultation plan was reviewed with the patient.      I spoke with the pt at length about different weight loss medications and options. Pt is in a wheel chair but can stand with assistance. Limited mobility though. Pt wanted to start ozempic since her friend in the nursing home is on it and doing well with it. Pt does not have any hx of DMII. I informed the pt without a hx of DM II, insurance would not cover ozempic or mounjaro. Pt does have a cardiac hx including non- STEMI and CAD, HTN. HLD. Will try for wegovy but likely will not be approved with insurance. Pt would like to also consider bariatric surgery. She does have a hx of barretts esophagus and would possibly benefit from gastric bypass over gastric sleeve but with her medical hx and  age, it would be very risky for gastric bypass. Will have pt follow up in 1 month with DR. Stokes to discuss options then.     Pt denies hx of medullary thyroid cancer/ multiple endocrine neoplasia type 2/ gastroparesis/ pancreatitis / chance of pregnancy / breast feeding if prescribed semaglutide/ tirzepatide. PT encouraged to call the office with any nausea/ vomiting/ abd pain/ constipation if on a GLP-1/ GLP-1/ GIP medication.     Encounter Diagnosis   Name Primary?    Obesity, Class III, BMI 40-49.9 (morbid obesity) Yes       Plan:    Patient will have evaluations and follow up with bariatric dietician before undergoing a multidisciplinary review of their candidacy.  We also discussed other program requirements.      Total time spent with pt 60 minutes of which 45 minutes were spent on education.     Adwoa Gallagher, APRN  12/19/2024

## 2025-01-13 ENCOUNTER — TELEPHONE (OUTPATIENT)
Dept: BARIATRICS/WEIGHT MGMT | Facility: CLINIC | Age: 75
End: 2025-01-13
Payer: MEDICARE

## 2025-01-13 NOTE — TELEPHONE ENCOUNTER
AMARIS, NURSE/EFRAÍNON FROM [facility] SENT OVER PA FROM THEIR OFFICE/REHAB REGARDING TRYING TO GET INSURANCE TO APPROVE PT'S WEGOVY.     CALL BACK REGARDING PT'S MEDICATIONS -[phone] PLEASE ASK FOR AMARIS     MESSAGE SENT TO BRENNA FOR ADVISEMENT.

## 2025-01-20 ENCOUNTER — OFFICE VISIT (OUTPATIENT)
Dept: BARIATRICS/WEIGHT MGMT | Facility: CLINIC | Age: 75
End: 2025-01-20
Payer: MEDICARE

## 2025-01-20 DIAGNOSIS — E66.01 OBESITY, CLASS III, BMI 40-49.9 (MORBID OBESITY): Primary | ICD-10-CM

## 2025-01-20 PROCEDURE — 1160F RVW MEDS BY RX/DR IN RCRD: CPT | Performed by: SURGERY

## 2025-01-20 PROCEDURE — 99213 OFFICE O/P EST LOW 20 MIN: CPT | Performed by: SURGERY

## 2025-01-20 PROCEDURE — 1159F MED LIST DOCD IN RCRD: CPT | Performed by: SURGERY

## 2025-01-20 NOTE — PROGRESS NOTES
HISTORY AND PHYSICAL      Patient Care Team:  Mara Russell MD as PCP - General (Pulmonary Disease)  Gregory Gallo MD as PCP - Family Medicine    Chief complaint morbid obesity    Subjective     Patient is a 74 y.o. female presents with morbid obesity with a BMI of 46.    She has a history of Arguello's esophagus and also cardiac stent.  She is a resident of a nursing home.     She says she does not believe she eats very much and only eats about half of her plate.  She does drink about 2 Cokes a day.      Review of Systems   Pertinent items are noted in HPI    History  Past Medical History:   Diagnosis Date    Anxiety     Back pain     CAD (coronary artery disease)     Confusion     Early onset Alzheimer's dementia     GERD (gastroesophageal reflux disease)     Hypertension     Mood disorder     Psoriasis     Thyroid disease      Past Surgical History:   Procedure Laterality Date    ANKLE ARTHROSCOPY W/ OPEN REPAIR      BACK SURGERY      x3    BREAST LUMPECTOMY Right     cyst only    CARDIAC CATHETERIZATION      Dr Pinzon    COLONOSCOPY N/A 4/22/2024    Procedure: COLONOSCOPY WITH POLYPECTOMY X 3;  Surgeon: Rico Allen MD;  Location: [facility] ENDOSCOPY;  Service: Gastroenterology;  Laterality: N/A;  POST- POLYPS    CORONARY ANGIOPLASTY WITH STENT PLACEMENT      ENDOSCOPY N/A 9/10/2020    Procedure: ESOPHAGOGASTRODUODENOSCOPY with dilation (bougie 48, 52, 56, 58) and biopsy x 1 area ;  Surgeon: Sarahi Hoskins MD;  Location: [facility] ENDOSCOPY;  Service: Gastroenterology;  Laterality: N/A;  hiatal hernia, esophageal stricture    ENDOSCOPY N/A 4/22/2024    Procedure: ESOPHAGOGASTRODUODENOSCOPY WITH BIOPSIES;  Surgeon: Rico Allen MD;  Location: [facility] ENDOSCOPY;  Service: Gastroenterology;  Laterality: N/A;  POST- BARRETTS ESOPHAGUS, HIATAL HERNIA    HYSTERECTOMY      SIGMOIDOSCOPY N/A 9/10/2020    Procedure: FLEX SIG WITH HEMORROID BANDING x7;  Surgeon: Sarahi Hoskins MD;   Location: [facility] ENDOSCOPY;  Service: Gastroenterology;  Laterality: N/A;  hemorroids poor prep     Family History   Problem Relation Age of Onset    No Known Problems Mother     No Known Problems Father      Social History     Tobacco Use    Smoking status: Never    Smokeless tobacco: Never   Vaping Use    Vaping status: Never Used   Substance Use Topics    Alcohol use: Not Currently    Drug use: Never     (Not in a hospital admission)    Allergies:  Iodine, Methotrexate derivatives, Naproxen, Methotrexate, Sulfa antibiotics, Sulfamethoxazole, Trimethoprim, and Morphine    Objective     Vital Signs  BP: ()/()   Arterial Line BP: ()/()     Physical Exam:      General Appearance:    Alert, cooperative, in no acute distress   Head:    Normocephalic, without obvious abnormality, atraumatic   Eyes:            Lids and lashes normal, conjunctivae and sclerae normal, no   icterus, no pallor, corneas clear, PERRLA   Ears:    Ears appear intact with no abnormalities noted   Throat:   No oral lesions, no thrush, oral mucosa moist   Neck:   No adenopathy, supple, trachea midline, no thyromegaly, no   carotid bruit, no JVD   Back:     No kyphosis present, no scoliosis present, no skin lesions,      erythema or scars, no tenderness to percussion or                   palpation,   range of motion normal   Lungs:     Clear to auscultation,respirations regular, even and                  unlabored    Heart:    Regular rhythm and normal rate, normal S1 and S2, no            murmur, no gallop, no rub, no click   Chest Wall:    No abnormalities observed   Abdomen:     Normal bowel sounds, no masses, no organomegaly, soft        non-tender, non-distended, no guarding, no rebound                tenderness   Rectal:     Deferred   Extremities:   Moves all extremities well, no edema, no cyanosis, no             redness   Pulses:   Pulses palpable and equal bilaterally   Skin:   No bleeding, bruising or rash   Lymph nodes:   No palpable  adenopathy   Neurologic:   Cranial nerves 2 - 12 grossly intact, sensation intact, DTR       present and equal bilaterally     Results Review:    I reviewed the patient's new clinical results.  I reviewed the patient's new imaging results and agree with the interpretation.    Assessment & Plan   Morbid obesity      74-year-old lady with Arguello's esophagus and poor mobility with BMI 46 is here today inquiring about possible surgery.  I think she is too high risk for gastric sleeve given her Arguello's esophagus and she is also not someone who appears to have an issue with high appetite or eating large portions.  I do think she would be a good surgical candidate and I think gastric bypass desist too risk for her.      Yumiko Stokes MD  01/20/25  11:43 EST

## 2025-01-21 ENCOUNTER — TELEPHONE (OUTPATIENT)
Dept: BARIATRICS/WEIGHT MGMT | Facility: CLINIC | Age: 75
End: 2025-01-21
Payer: MEDICARE

## 2025-01-21 NOTE — TELEPHONE ENCOUNTER
Returned call and LVM  --------------------------------------------------------------------  Rafaela Whitt [phone] is calling regarding the patient. She wants to know what options are available for the patient. I looked at your note and you state sleeve is not an option and bypass is risky, but you state she is a good surgical candidate. I just told them I needed to reach out to you for clarification.   ------------------------------------------------------  13:50 1/21/25  PATTY Stokes MD  She is not a good candidate. I will correct my error in the note. At this time we have nothing to offer her  -----------------------------------------------  31 mins 1/21/25  HARJINDER VARELA thank you

## 2025-02-09 NOTE — PROGRESS NOTES
Case Management Discharge Note      Final Note: [facility]              Service Provider Request Status Selected Services Address Phone Number Fax Number    [facility] [place] Selected Home Health Services [address] [place] [address], [place] [address] [phone]                   Final Discharge Disposition Code: 06 - home with home health care   Physical Therapy    Visit Type: treatment  SUBJECTIVE  Patient irritable and resistant to cues and to mobilize with RN staff outside of therapy sessions. Patient adamant about not sitting up in recliner as well. Attempted to educate on the risks of continued mobility and patient continued to refuse \"I'll get up when you guys come and that's it.\" Due to patient's resistance in actively participating in own recovery/care will lower frequency of therapy services.  Patient / Family Goal: maximize function    Pain   Patient denies pain., Patient does not demonstrate pain behaviors.     OBJECTIVE     Cognitive Status   Functional Communication   - Overall Communication Status: within functional limits   - Forms of Communication: verbal  Following Direction   - follows all commands and directions consistently  Memory   - impaired    Vitals:  Blood Pressure (mmhg):      - Seated: 125/62, asymptomatic    Patient Activity Tolerance: 1 to 1 activity to rest (self limiting behaviors)       Sitting Balance  (CAMERON = base of support)  Static      - Trial 1 details: stand by assist, with back unsupported and with double LE support  Dynamic      - Trial 1 details: stand by assist, with back unsupported and with double LE support    Standing Balance  (CAMERON = base of support)  Firm Surface: Double Leg      - Static, Eyes Open       - Trial 1 details: minimal assist and with double UE support     - Dynamic, Eyes Open       - Trial 1 details: moderate assist and with double UE support       Bed Mobility  - Supine to sit: stand by assist  - Sit to supine: minimal assist  Head of bed elevated, use of rail. Supine to sit, increased time. Sit to supine: Encouraged to actively participate without assist and patient obstinate requesting assist for B LE elevated back onto bed, able to complete initial 75% of full motion.  Transfers  Assistive devices: gait belt, 2-wheeled walker  - Sit to stand: moderate assist, 2 person  - Stand to sit:  moderate assist  - Stand pivot: minimal assist, 2 person  Sit to stand x2 from edge of bed to 2 wheeled walker with verbal cues for hand placement, B feet blocked from sliding anteriorly and anterior weight shift. Required extensive assist \"pull me up!\" Increased time. Stand to sit walker to recliner x2 trials with limited eccentric control, good hand placement. Completed x1 pivot bed to recliner with use of walker and increased time and assist of 2 due to notable B LE weakness and near-buckling. Completed sit <> stand recliner > stedy and stedy > bed. Stedy utilized due to overall weakness and end of session to transfer back to bed, continued to required mod assist of 2. Declined to sit on paddles during pivot \"you said I was going back to bed!\" Patient confused throughout session.    Ambulation / Gait  - Assistive device: gait belt, 2-wheeled walker and follow with wheelchair  - Distance (feet unless otherwise indicated): 3  - Assist Level: moderate assist  - Surface: even  - Description: heavy reliance on BUE, forward flexed at hips, flexed at knees, unsteady, wide base of support, step to and loss of balance    Limited awareness of safety concerns. Tendency for posterior LOB with poor righting reactions with extensive assist to recover. Close chair follow due to HIGH fall risk. Distance limited. Labored B foot drag, poor foot clearance.    Interventions   Time spent educating patient on therapist role, PT POC, assistive device use, safety precautions, impairments and functional mobilities, DC recommendations. Patient demonstrated limited understanding and comprehension throughout session. Patient agreeable to treatment.       Skilled input: Verbal instruction/cues, tactile instruction/cues and as detailed above  Verbal Consent: Writer verbally educated and received verbal consent for hand placement, positioning of patient, and techniques to be performed today from patient for clothing adjustments for techniques,  hand placement and palpation for techniques and therapist position for techniques as described above and how they are pertinent to the patient's plan of care.         Education:   - Present and ready to learn: patient  Education provided during session:  - Results of above outlined education: Verbalizes understanding and Needs reinforcement    ASSESSMENT   Progress: no functional improvements (self limiting and resistive)  Interfering components: medical status limitations, decreased activity tolerance, decreased insight into deficit, mood/coping and minimal participation    Discharge needs based on today's assessment:   - Current level of function: significantly below baseline level of function   - Therapy needs at discharge: therapy 5 or more times per week (pending participation)   - Activities of daily living (ADLs) requiring support at discharge: bed mobility, transfers and ambulation   - Instrumental activities of daily living (IADLs) requiring support at discharge: emergency responses, community mobility and home management   - Impairments that require further therapy intervention: cognition, balance, strength, activity tolerance, pain and safety awareness    AM-PAC  - Generalized Prior Level of Function: IND/MOD I ([facility] 22-24)       Key: MOD A=moderate assistance, IND/MOD I=independent/modified independent  - Generalized Current Level of Function     - Current Mobility Score: 10       AM-PAC Scoring Key= >21 Modified Independent; 20-21 Supervision; 18-19 Minimal assist; 13-17 Moderate assist; 9-12 Max assist; <9 Total assist      PLAN (while hospitalized)  Suggestions for next session as indicated: Progress independence and safety with bed mobility, transfers and gait as safely able.  PT Frequency: 1-2 x per week      PT/OT Mobility Equipment for Discharge: wheelchair, sit to stand lift vs jaylyn  PT/OT ADL Equipment for Discharge: Pending progress  Agreement to plan and goals: patient agrees with goals and  treatment plan        GOALS  Review Date: 2/8/2025  Long Term Goals: (to be met by time of discharge from hospital)  Sit to supine: Patient will complete sit to supine independent.  Status: progressing/ongoing  Supine to sit: Patient will complete supine to sit independent.  Status: progressing/ongoing  Sit to stand: Patient will complete sit to stand transfer with 2-wheeled walker, minimal assist.   Status: progressing/ongoing  Stand to sit: Patient will complete stand to sit transfer with 2-wheeled walker, minimal assist.   Status: progressing/ongoing  Stand pivot: Patient will complete stand pivot transfer with 2-wheeled walker, minimal assist.   Status: progressing/ongoing  Documented in the chart in the following areas: Assessment/Plan.      Patient at End of Session:   Location: in bed (refused chair)  Safety measures: alarm system in place/re-engaged, call light within reach, equipment intact and lines intact  Handoff to: nurse      Therapy procedure time and total treatment time can be found documented on the Time Entry flowsheet

## 2025-03-03 ENCOUNTER — OFFICE (AMBULATORY)
Dept: URBAN - METROPOLITAN AREA CLINIC 64 | Facility: CLINIC | Age: 75
End: 2025-03-03
Payer: MEDICARE

## 2025-03-03 VITALS
DIASTOLIC BLOOD PRESSURE: 74 MMHG | HEIGHT: 62 IN | SYSTOLIC BLOOD PRESSURE: 136 MMHG | HEART RATE: 58 BPM | WEIGHT: 253 LBS

## 2025-03-03 DIAGNOSIS — R10.32 LEFT LOWER QUADRANT PAIN: ICD-10-CM

## 2025-03-03 DIAGNOSIS — K52.9 NONINFECTIVE GASTROENTERITIS AND COLITIS, UNSPECIFIED: ICD-10-CM

## 2025-03-03 DIAGNOSIS — R15.9 FULL INCONTINENCE OF FECES: ICD-10-CM

## 2025-03-03 PROCEDURE — 99213 OFFICE O/P EST LOW 20 MIN: CPT | Performed by: NURSE PRACTITIONER

## 2025-03-03 RX ORDER — LINACLOTIDE 290 UG/1
290 CAPSULE, GELATIN COATED ORAL
Qty: 0 | Refills: 0 | Status: COMPLETED
End: 2025-03-03

## 2025-03-03 RX ORDER — DOCUSATE SODIUM 100 MG/1
200 TABLET, FILM COATED ORAL
Qty: 0 | Refills: 0 | Status: COMPLETED
End: 2025-03-03

## 2025-03-03 RX ORDER — DICYCLOMINE HYDROCHLORIDE 10 MG/1
30 CAPSULE ORAL
Qty: 90 | Refills: 11 | Status: ACTIVE
Start: 2025-03-03

## 2025-06-12 ENCOUNTER — OFFICE (AMBULATORY)
Dept: URBAN - METROPOLITAN AREA CLINIC 64 | Facility: CLINIC | Age: 75
End: 2025-06-12
Payer: MEDICARE

## 2025-06-12 VITALS
SYSTOLIC BLOOD PRESSURE: 115 MMHG | WEIGHT: 253 LBS | HEART RATE: 58 BPM | DIASTOLIC BLOOD PRESSURE: 60 MMHG | HEIGHT: 62 IN

## 2025-06-12 DIAGNOSIS — R10.32 LEFT LOWER QUADRANT PAIN: ICD-10-CM

## 2025-06-12 DIAGNOSIS — R14.0 ABDOMINAL DISTENSION (GASEOUS): ICD-10-CM

## 2025-06-12 PROCEDURE — 99213 OFFICE O/P EST LOW 20 MIN: CPT | Performed by: NURSE PRACTITIONER

## (undated) DEVICE — BITEBLOCK ENDO W/STRAP 60F A/ LF DISP

## (undated) DEVICE — PK ENDO GI 50

## (undated) DEVICE — TRAP WIDEEYE POLYP

## (undated) DEVICE — MULTIPLE BAND LIGATOR: Brand: SPEEDBAND SUPERVIEW SUPER 7

## (undated) DEVICE — PAPR PRNT PK SONY W RIBN UPC55

## (undated) DEVICE — SNAR POLYP HOTSNARE/BRAIDED OVL/MINI 7F 2.8X10MM 230CM 1P/U

## (undated) DEVICE — SINGLE-USE BIOPSY FORCEPS: Brand: RADIAL JAW 4